# Patient Record
Sex: FEMALE | Race: OTHER | HISPANIC OR LATINO | Employment: PART TIME | URBAN - METROPOLITAN AREA
[De-identification: names, ages, dates, MRNs, and addresses within clinical notes are randomized per-mention and may not be internally consistent; named-entity substitution may affect disease eponyms.]

---

## 2017-09-20 ENCOUNTER — ALLSCRIPTS OFFICE VISIT (OUTPATIENT)
Dept: OTHER | Facility: OTHER | Age: 37
End: 2017-09-20

## 2017-11-21 ENCOUNTER — ALLSCRIPTS OFFICE VISIT (OUTPATIENT)
Dept: OTHER | Facility: OTHER | Age: 37
End: 2017-11-21

## 2017-11-22 NOTE — PROGRESS NOTES
Assessment    1  Salivary gland infection (527 2) (K11 20)    Plan  Salivary gland infection    · Amoxicillin 875 MG Oral Tablet; TAKE 1 TABLET EVERY 12 HOURS DAILY   · Ibuprofen 800 MG Oral Tablet; TAKE 1 TABLET 3 TIMES DAILY WITH FOOD ASNEEDED   · PredniSONE 10 MG Oral Tablet; TAKE 3 TABLET Daily    Discussion/Summary  Possible side effects of new medications were reviewed with the patient/guardian today  The treatment plan was reviewed with the patient/guardian  The patient/guardian understands and agrees with the treatment plan      Chief Complaint  Pt here for swelling on left side of her face after seeing the dentist yesterday      History of Present Illness  HPI: was in the dentist 's office yesterday for cleaningcheek has been swollen this am and has a lot of pain    Facial Pain (Brief): The patient is being seen for an initial evaluation of facial pain  Symptoms:  facial pain, but-- no facial weakness-- and-- no facial paresthesias  The patient is currently experiencing symptoms  Current treatment includes nonsteroidal anti-inflammatory drugs  By report there is fair symptom control  Review of Systems   Constitutional: no fever-- and-- no chills  ENT: as noted in HPI  Cardiovascular: no complaints of slow or fast heart rate, no chest pain, no palpitations, no leg claudication or lower extremity edema  Respiratory: no complaints of shortness of breath, no wheezing, no dyspnea on exertion, no orthopnea or PND  Active Problems  1  Depression (311) (F32 9)   2  Encounter for routine gynecological examination (V72 31) (Z01 419)   3  Lower back pain (724 2) (M54 5)   4  Need for prophylactic vaccination and inoculation against influenza (V04 81) (Z23)   5  Obesity (BMI 30 0-34 9) (278 00) (E66 9)   6  Rosacea (695 3) (L71 9)   7  Screening for diabetes mellitus (DM) (V77 1) (Z13 1)   8  Screening for lipoid disorders (V77 91) (Z13 220)   9   Screening for STD (sexually transmitted disease) (V74 5) (Z11 3)    Past Medical History  1  History of Cervical Cancer (V10 41)   2  History of Chlamydial Disease (078 88)   3  History of cervical cancer (V10 41) (Z85 41)   4  History of pregnancy (V13 29)   5  History of pregnancy (V13 29)   6  History of pregnancy (V13 29)   7  History of Moderate dysplasia of cervix (SALBADOR II) (622 12) (N87 1)   8  History of Normal pregnancy (V22 2) (Z34 90)   9  History of Normal pregnancy (V22 2) (Z34 90)  Active Problems And Past Medical History Reviewed: The active problems and past medical history were reviewed and updated today  Family History  Father    1  Family history of diabetes mellitus (V18 0) (Z83 3)  Maternal Grandmother    2  Family history of diabetes mellitus (V18 0) (Z83 3)   3  Family history of Ovarian cancer  Paternal Grandmother    4  Family history of diabetes mellitus (V18 0) (Z83 3)  Maternal Aunt    5  Family history of diabetes mellitus (V18 0) (Z83 3)  Paternal Aunt    10  Family history of malignant neoplasm of breast (V16 3) (Z80 3)  Family History    7  Family history of Breast Cancer (V16 3)   8  Family history of Diabetes Mellitus (V18 0)   9  Family history of Hypertension (V17 49)   10  Family history of Ovarian Cyst  Family History Reviewed: The family history was reviewed and updated today  Social History   · Denied: History of Alcohol use   · Current every day smoker (305 1) (F17 200)   · Current Smoker (305 1)   · Lack of exercise (V69 0) (Z72 3)   · Marital History - Single  The social history was reviewed and updated today  The social history was reviewed and is unchanged  Surgical History    1  History of Cervical Loop Electrosurgical Excision (LEEP)   2  History of Foot Surgery   3  History of Foot Surgery   4  History of Tonsillectomy With Adenoidectomy   5  History of Tonsillectomy With Adenoidectomy   6  History of Tubal Ligation  Surgical History Reviewed: The surgical history was reviewed and updated today  Current Meds   1  Cyclobenzaprine HCl - 10 MG Oral Tablet; TAKE 1 TABLET 3 TIMES DAILY AS NEEDED; Therapy: 29ZOS4146 to (Sherre Sacks)  Requested for: 85WWS2740; Last Rx:87Fpa0594 Ordered   2  Fluticasone Propionate 50 MCG/ACT Nasal Suspension; USE 2 SPRAYS IN EACH NOSTRIL ONCE DAILY; Therapy: 64WBN2819 to (Juan Manuel Flores)  Requested for: 37XES7136 Ordered   3  Ubolkvooh-Enfchmcu-IV 30-2-10 MG/5ML Oral Syrup; TAKE 5 ML EVERY 4 TO 6 HOURS AS NEEDED; Therapy: 80OAT4306 to (Evaluate:03Fbd9812)  Requested for: 83YRQ1982; Last Rx:51Omt5990 Ordered    The medication list was reviewed and updated today  Allergies  1  No Known Drug Allergies  2  No Known Environmental Allergies   3  No Known Food Allergies    Vitals   Recorded: 21Nov2017 02:18PM   Heart Rate 96   Respiration 16   Systolic 365   Diastolic 66   Height 5 ft 5 in   Weight 192 lb    BMI Calculated 31 95   BSA Calculated 1 94       Physical Exam   Constitutional  General appearance: No acute distress, well appearing and well nourished  Ears, Nose, Mouth, and Throat  External inspection of ears and nose: Normal    Otoscopic examination: Tympanic membranes translucent with normal light reflex  Canals patent without erythema  Oropharynx: Abnormal  -- decayed tooth left inner lower tooth  Pulmonary  Respiratory effort: No increased work of breathing or signs of respiratory distress  Auscultation of lungs: Clear to auscultation  Cardiovascular  Palpation of heart: Normal PMI, no thrills  Auscultation of heart: Normal rate and rhythm, normal S1 and S2, without murmurs  Skin  Skin and subcutaneous tissue: Abnormal  -- left salivary gland swelling          Signatures   Electronically signed by : Elodia Rodriguez MD; Nov 21 2017  2:39PM EST                       (Author)

## 2017-12-12 ENCOUNTER — GENERIC CONVERSION - ENCOUNTER (OUTPATIENT)
Dept: OTHER | Facility: OTHER | Age: 37
End: 2017-12-12

## 2017-12-12 ENCOUNTER — ALLSCRIPTS OFFICE VISIT (OUTPATIENT)
Dept: OTHER | Facility: OTHER | Age: 37
End: 2017-12-12

## 2017-12-12 DIAGNOSIS — Z13.220 ENCOUNTER FOR SCREENING FOR LIPOID DISORDERS: ICD-10-CM

## 2017-12-12 DIAGNOSIS — E66.9 OBESITY: ICD-10-CM

## 2017-12-12 DIAGNOSIS — Z13.1 ENCOUNTER FOR SCREENING FOR DIABETES MELLITUS: ICD-10-CM

## 2018-01-14 VITALS
SYSTOLIC BLOOD PRESSURE: 98 MMHG | HEART RATE: 68 BPM | BODY MASS INDEX: 31.7 KG/M2 | TEMPERATURE: 99.1 F | RESPIRATION RATE: 16 BRPM | WEIGHT: 190.5 LBS | DIASTOLIC BLOOD PRESSURE: 66 MMHG

## 2018-01-15 VITALS
HEIGHT: 65 IN | WEIGHT: 192 LBS | DIASTOLIC BLOOD PRESSURE: 66 MMHG | RESPIRATION RATE: 16 BRPM | SYSTOLIC BLOOD PRESSURE: 102 MMHG | HEART RATE: 96 BPM | BODY MASS INDEX: 31.99 KG/M2

## 2018-01-24 VITALS
RESPIRATION RATE: 16 BRPM | HEIGHT: 65 IN | BODY MASS INDEX: 31.53 KG/M2 | WEIGHT: 189.25 LBS | SYSTOLIC BLOOD PRESSURE: 102 MMHG | DIASTOLIC BLOOD PRESSURE: 60 MMHG | HEART RATE: 76 BPM

## 2018-01-30 NOTE — PROGRESS NOTES
Assessment   1  Encounter for preventive health examination (V70 0) (Z00 00)  2  Foot pain, right (729 5) (M79 671)  3  Plantar wart, right foot (078 12) (B07 0)  4  Obesity (BMI 30 0-34 9) (278 00) (E66 9)  5  Rosacea (695 3) (L71 9)  6  Screening for diabetes mellitus (DM) (V77 1) (Z13 1)  7  Screening for lipoid disorders (V77 91) (Z13 220)    Plan  Foot pain, right    · 1 - Syed Stephen, [facility]  *  Status: Hold For - Scheduling   Requested for: 13ZRP2145  () Care Summary provided  : Yes  Health Maintenance    · Follow-up visit in 1 year Evaluation and Treatment  Follow-up  Status: Hold For -  Scheduling  Requested for: 70Ggv0015   · Always use a seat belt and shoulder strap when riding or driving a motor vehicle ;  Status:Complete;   Done: 56UXF6481   · Begin a limited exercise program ; Status:Complete;   Done: 80AKP6664   · Begin or continue regular aerobic exercise  Gradually work up to at least 3 sessions of 30  minutes of exercise a week ; Status:Complete;   Done: 71ZAA6105   · Diets that are low in carbohydrates and high in protein are very popular for weight loss ;  Status:Complete;   Done: 97RBR0599   · Eat a low fat and low cholesterol diet ; Status:Complete;   Done: 71HQK7042   · Use a sun block product with an SPF of 15 or more ; Status:Complete;   Done:  51DUR1792   · We recommend that you bring your body mass index down to 26 ; Status:Complete;    Done: 65MQE5095   · We recommend you modify your diet to achieve and maintain a healthy weight  Being  underweight may increase your risk of developing health problems from vitamin and  mineral deficiencies  We recommend a balanced diet rich in fruits and vegetables  You  may also consider increasing your calorie intake by eating more frequently or adding  nuts, avocados, and low-fat cheese or milk to your meals    Please let us know  if you would like to learn more about your nutrition and calorie needs, and additional  options to help you achieve your weight goals ; Status:Complete;   Done: 53MJJ3804  Obesity (BMI 30 0-34 9), Screening for diabetes mellitus (DM), Screening for lipoid  disorders    · (1) COMPREHENSIVE METABOLIC PANEL; Status:Active; Requested for:59Btd5577;    · (1) LIPID PANEL, FASTING; Status:Active; Requested for:91Hey9918;    · (1) TSH WITH FT4 REFLEX; Status:Active; Requested for:91Iyo4832;   Plantar wart, right foot    · Start: Salicylic Acid Wart Remover 27 5 % External Liquid; apply once a day for 4-6  weeks  Rosacea    · Start: MetroNIDAZOLE 0 75 % External Gel; APPLY  AND RUB  IN A THIN FILM TO  AFFECTED AREAS TWICE DAILY  (AM AND PM)    Discussion/Summary  health maintenance visit Currently, she eats an adequate diet  the risks and benefits of cervical cancer screening were discussed cervical cancer screening is needed every three years Breast cancer screening: the risks and benefits of breast cancer screening were discussed and breast cancer screening is not indicated  Colorectal cancer screening: the risks and benefits of colorectal cancer screening were discussed and colorectal cancer screening is not indicated  Chief Complaint  PT is being seen today for a  visit  History of Present Illness  HM, Adult Female: The patient is being seen for a health maintenance evaluation  The last health maintenance visit was 1 year(s) ago  General Health: The patient's health since the last visit is described as good  She has regular dental visits  She denies vision problems  Immunizations status: not up to date  Lifestyle:  She consumes a diverse and healthy diet  She does not have any weight concerns  She does not exercise regularly  She does not use tobacco  She denies alcohol use  She denies drug use  Reproductive health: Micheal Rviero She reports normal menses  She uses no contraception  She is sexually active  Pregnancy history: G1  31 P1  31   Screening: cancer screening reviewed and updated     metabolic screening reviewed and updated  risk screening reviewed and updated  HPI: continued to have right foot pain   seen by 3 different Podiatrist   went through PT which didn't help           1 Amended By: Ximena Osorio; Dec 12 2017 10:42 AM EST    Review of Systems    Constitutional: No fever, no chills, feels well, no tiredness, no recent weight gain or weight loss  Eyes: No complaints of eye pain, no red eyes, no eyesight problems, no discharge, no dry eyes, no itching of eyes  ENT: no complaints of earache, no loss of hearing, no nose bleeds, no nasal discharge, no sore throat, no hoarseness  Cardiovascular: No complaints of slow heart rate, no fast heart rate, no chest pain, no palpitations, no leg claudication, no lower extremity edema  Respiratory: No complaints of shortness of breath, no wheezing, no cough, no SOB on exertion, no orthopnea, no PND  Gastrointestinal: No complaints of abdominal pain, no constipation, no nausea or vomiting, no diarrhea, no bloody stools  Genitourinary: No complaints of dysuria, no incontinence, no pelvic pain, no dysmenorrhea, no vaginal discharge or bleeding  Musculoskeletal: arthralgias, but no myalgias  Integumentary: No complaints of skin rash or lesions, no itching, no skin wounds, no breast pain or lump  Neurological: No complaints of headache, no confusion, no convulsions, no numbness, no dizziness or fainting, no tingling, no limb weakness, no difficulty walking  Psychiatric: Not suicidal, no sleep disturbance, no anxiety or depression, no change in personality, no emotional problems  Endocrine: No complaints of proptosis, no hot flashes, no muscle weakness, no deepening of the voice, no feelings of weakness  Hematologic/Lymphatic: No complaints of swollen glands, no swollen glands in the neck, does not bleed easily, does not bruise easily  Active Problems   1  Encounter for routine gynecological examination (V72 31) (Z01 419)  2   Lower back pain (724 2) (M54 5)  3  Need for prophylactic vaccination and inoculation against influenza (V04 81) (Z23)  4  Obesity (BMI 30 0-34 9) (278 00) (E66 9)  5  Rosacea (695 3) (L71 9)  6  Screening for diabetes mellitus (DM) (V77 1) (Z13 1)  7  Screening for lipoid disorders (V77 91) (Z13 220)  8  Screening for STD (sexually transmitted disease) (V74 5) (Z11 3)    Past Medical History    · History of Cervical Cancer (V10 41)   · History of Chlamydial Disease (078 88)   · History of cervical cancer (V10 41) (Z85 41)   · History of pregnancy (V13 29)   · History of pregnancy (V13 29)   · History of pregnancy (V13 29)   · History of Moderate dysplasia of cervix (SALBADOR II) (622 12) (N87 1)   · History of Normal pregnancy (V22 2) (Z34 90)   · History of Normal pregnancy (V22 2) (Z34 90)    Surgical History    · History of Cervical Loop Electrosurgical Excision (LEEP)   · History of Foot Surgery   · History of Foot Surgery   · History of Tonsillectomy With Adenoidectomy   · History of Tonsillectomy With Adenoidectomy   · History of Tubal Ligation    Family History  Father    · Family history of diabetes mellitus (V18 0) (Z83 3)  Maternal Grandmother    · Family history of diabetes mellitus (V18 0) (Z83 3)   · Family history of Ovarian cancer  Paternal Grandmother    · Family history of diabetes mellitus (V18 0) (Z83 3)  Maternal Aunt    · Family history of diabetes mellitus (V18 0) (Z83 3)  Paternal Aunt    · Family history of malignant neoplasm of breast (V16 3) (Z80 3)  Family History    · Family history of Breast Cancer (V16 3)   · Family history of Diabetes Mellitus (V18 0)   · Family history of Hypertension (V17 49)   · Family history of Ovarian Cyst    Social History    · Denied: History of Alcohol use   · Current every day smoker (305 1) (F17 200)   · Current Smoker (305 1)   · Lack of exercise (V69 0) (Z72 3)   · Marital History - Single    Current Meds  1   Cyclobenzaprine HCl - 10 MG Oral Tablet; TAKE 1 TABLET 3 TIMES DAILY AS NEEDED; Therapy: 53LBM1712 to (Luz Maria Velasco)  Requested for: 44WGS7751; Last   Rx:09Mby3188 Ordered  2  Fluticasone Propionate 50 MCG/ACT Nasal Suspension; USE 2 SPRAYS IN EACH   NOSTRIL ONCE DAILY; Therapy: 96AUA8494 to (Last Rx:46Jol5340)  Requested for: 10XZC2091 Ordered    Allergies   1  No Known Drug Allergies   2  No Known Environmental Allergies  3  No Known Food Allergies    Vitals   Recorded: 97Tzr5358 10:16AM   Heart Rate 76   Respiration 16   Systolic 678   Diastolic 60   Height 5 ft 4 96 in   Weight 189 lb 4 oz   BMI Calculated 31 53   BSA Calculated 1 93     Physical Exam    Constitutional   General appearance: No acute distress, well appearing and well nourished  Head and Face   Head and face: Normal     Palpation of the face and sinuses: No sinus tenderness  Eyes   Conjunctiva and lids: No swelling, erythema or discharge  Pupils and irises: Equal, round, reactive to light  Ophthalmoscopic examination: Normal fundi and optic discs  Ears, Nose, Mouth, and Throat   External inspection of ears and nose: Normal     Otoscopic examination: Tympanic membranes translucent with normal light reflex  Canals patent without erythema  Hearing: Normal     Nasal mucosa, septum, and turbinates: Normal without edema or erythema  Lips, teeth, and gums: Normal, good dentition  Oropharynx: Normal with no erythema, edema, exudate or lesions  Neck   Neck: Supple, symmetric, trachea midline, no masses  Thyroid: Normal, no thyromegaly  Pulmonary   Respiratory effort: No increased work of breathing or signs of respiratory distress  Percussion of chest: Normal     Auscultation of lungs: Clear to auscultation  Cardiovascular   Palpation of heart: Normal PMI, no thrills  Auscultation of heart: Normal rate and rhythm, normal S1 and S2, no murmurs      Examination of extremities for edema and/or varicosities: Normal     Chest   Chest: Normal     Abdomen   Abdomen: Non-tender, no masses  Liver and spleen: No hepatomegaly or splenomegaly  Lymphatic   Palpation of lymph nodes in neck: No lymphadenopathy  Palpation of lymph nodes in axillae: No lymphadenopathy  Palpation of lymph nodes in groin: No lymphadenopathy  Musculoskeletal   Gait and station: Normal     Digits and nails: Normal without clubbing or cyanosis  Joints, bones, and muscles: Abnormal   plantar wart right foot  Skin   Skin and subcutaneous tissue: Abnormal   rosacea bilateral cheek  Neurologic   Cranial nerves: Cranial nerves II-XII intact  Cortical function: Normal mental status  Reflexes: 2+ and symmetric  Sensation: No sensory loss  Coordination: Normal finger to nose and heel to shin  Psychiatric   Judgment and insight: Normal     Orientation to person, place, and time: Normal     Recent and remote memory: Intact      Mood and affect: Normal        Signatures   Electronically signed by : Terra Cm MD; Dec 12 2017 10:42AM EST                       (Author)

## 2018-10-08 ENCOUNTER — OFFICE VISIT (OUTPATIENT)
Dept: OBGYN CLINIC | Facility: HOSPITAL | Age: 38
End: 2018-10-08
Payer: COMMERCIAL

## 2018-10-08 VITALS
WEIGHT: 176 LBS | HEART RATE: 91 BPM | DIASTOLIC BLOOD PRESSURE: 71 MMHG | SYSTOLIC BLOOD PRESSURE: 101 MMHG | BODY MASS INDEX: 29.32 KG/M2 | HEIGHT: 65 IN

## 2018-10-08 DIAGNOSIS — Z11.3 SCREEN FOR SEXUALLY TRANSMITTED DISEASES: ICD-10-CM

## 2018-10-08 DIAGNOSIS — Z72.51 HIGH RISK SEXUAL BEHAVIOR, UNSPECIFIED TYPE: Primary | ICD-10-CM

## 2018-10-08 DIAGNOSIS — N93.0 PCB (POST COITAL BLEEDING): ICD-10-CM

## 2018-10-08 PROBLEM — N93.9 ABNORMAL UTERINE AND VAGINAL BLEEDING, UNSPECIFIED: Status: ACTIVE | Noted: 2018-10-08

## 2018-10-08 PROCEDURE — 87591 N.GONORRHOEAE DNA AMP PROB: CPT | Performed by: OBSTETRICS & GYNECOLOGY

## 2018-10-08 PROCEDURE — 99213 OFFICE O/P EST LOW 20 MIN: CPT | Performed by: OBSTETRICS & GYNECOLOGY

## 2018-10-08 PROCEDURE — 3725F SCREEN DEPRESSION PERFORMED: CPT | Performed by: OBSTETRICS & GYNECOLOGY

## 2018-10-08 PROCEDURE — 87491 CHLMYD TRACH DNA AMP PROBE: CPT | Performed by: OBSTETRICS & GYNECOLOGY

## 2018-10-08 NOTE — PATIENT INSTRUCTIONS
How to Stop Smoking   AMBULATORY CARE:   You will improve your health and the health of others around you  if you stop smoking  Your risk for heart and lung disease, cancer, stroke, heart attack, and vision problems will also decrease  You can benefit from quitting no matter how long you have smoked  Prepare to stop smoking:  Nicotine is a highly addictive drug found in cigarettes  Withdrawal symptoms can happen when you stop smoking and make it hard to quit  These include anxiety, depression, irritability, trouble sleeping, and increased appetite  You increase your chances of success if you prepare to quit  · Set a quit date  Amisha Barnes a date that is within the next 2 weeks  Do not pick a day that you think may be stressful or busy  Write down the day or [place] it on your calender  · Tell friends and family that you plan to quit  Explain that you may have withdrawal symptoms when you try to quit  Ask them to support you  They may be able to encourage you and help reduce your stress to make it easier for you to quit  · Make a list of your reasons for quitting  Put the list somewhere you will see it every day, such as your refrigerator  You can look at the list when you have a craving  · Remove all tobacco and nicotine products from your home, car, and workplace  Also, remove anything else that will tempt you to smoke, such as lighters, matches, or ashtrays  Clean your car, home, and places at work that smell like smoke  The smell of smoke can trigger a craving  · Identify triggers that make you want to smoke  This may include activities, feelings, or people  Also write down 1 way you can deal with each of your triggers  For example, if you want to smoke as soon as you wake up, plan another activity during this time, such as exercise  · Make a plan for how you will quit  Learn about the tools that can help you quit, such as medicine, counseling, or nicotine replacement therapy   Choose at least 2 options to help you quit  Tools to help you stop smoking:   · Counseling  from a trained healthcare provider can provide you with support and skills to quit smoking  The provider will also teach you to manage your withdrawal symptoms and cravings  You may receive counseling from one counselor, in group therapy, or through phone therapy called a quit line  · Nicotine replacement therapy (NRT)  such as nicotine patches, gum, or lozenges may help reduce your nicotine cravings  You may get these without a doctor's order  Do not use e-cigarettes or smokeless tobacco in place of cigarettes or to help you quit  They still contain nicotine  · Prescription medicines  such as nasal sprays or nicotine inhalers may help reduce your withdrawal symptoms  Other medicines may also be used to reduce your urge to smoke  Ask your healthcare provider about these medicines  You may need to start certain medicines 2 weeks before your quit date for them to work well  · Hypnosis  is a practice that helps guide you through thoughts and feelings  Hypnosis may help decrease your cravings and make you more willing to quit  · Acupuncture therapy  uses very thin needles to balance energy channels in the body  This is thought to help decrease cravings and symptoms of nicotine withdrawal      · Support groups  let you talk to others who are trying to quit or have already quit  It may be helpful to speak with others about how they quit  Manage your cravings:   · Avoid situations, people, and places that tempt you to smoke  Go to nonsmoking places, such as libraries or restaurants  Understand what tempts you and try to avoid these things  · Keep your hands busy  Hold things such as a stress ball or pen  · Put candy or toothpicks in your mouth  Keep lollipops, sugarless gum, or toothpicks with you at all times  · Do not have alcohol or caffeine  These drinks may tempt you to smoke   Drink healthy liquids such as water or juice instead  · Reward yourself when you resist your cravings  Rewards will motivate you and help you stay positive  · Do an activity that distracts you from your craving  Examples include going for a walk, exercising, or cleaning  Prevent weight gain after you quit:  You may gain a few pounds after you quit smoking  It is healthier for you to gain a few pounds than to continue to smoke  The following can help you prevent weight gain:  · Eat healthy foods  These include fruits, vegetables, whole-grain breads, low-fat dairy products, beans, lean meats, and fish  Eat healthy snacks, such as low-fat yogurt, if you get hungry between meals  · Drink water before, during, and between meals  This will make your stomach feel full and help prevent you from overeating  Ask your healthcare provider how much liquid to drink each day and which liquids are best for you  · Exercise  Take a walk or do some kind of exercise every day  Ask your healthcare provider what exercise is right for you  This may help reduce your cravings and reduce stress  For more support and information:   · [employer]  [employer]  Phone: 4- 255 - 927-8433  Web Address: www [employer]  gov  © 2017 [address] [employer] Information is for End User's use only and may not be sold, redistributed or otherwise used for commercial purposes  All illustrations and images included in CareNotes® are the copyrighted property of A D A M , Inc  or [employer]org[employer]  The above information is an  only  It is not intended as medical advice for individual conditions or treatments  Talk to your doctor, nurse or pharmacist before following any medical regimen to see if it is safe and effective for you

## 2018-10-08 NOTE — PROGRESS NOTES
Assessment/Plan:    Problem List Items Addressed This Visit        Other    PCB (post coital bleeding)     Reports post-coital bleeding 3 instances this past week for first time  No gross abnormalities noted on exam  Will r/o STD w/ GC/CT  Patient to return in 2 weeks for annual gyn exam and to follow up status of post coital bleeding         Relevant Orders    Chlamydia/GC amplified DNA by PCR      Other Visit Diagnoses     High risk sexual behavior, unspecified type    -  Primary    Relevant Orders    Chlamydia/GC amplified DNA by PCR    Screen for sexually transmitted diseases        Relevant Orders    Chlamydia/GC amplified DNA by PCR          Subjective:      Patient ID: Olman Verma is a 41 y o  female  HPI  Margaret Jaeger is a 42 yo  with BTL for contraception presenting with complaints of coital/ post-coital bleeding  LMP was 2018  She states this past week she has had 3 instances where she has noted bright red blood during intercourse  She reports the bleeding stops after intercourse and that this has never happened in the past  Patient has regular menses and denies any intermenstrual bleeding  She denies any dyspareunia, vaginal discharge, odor, or itching  She denies any dysuria  She reports a past history of chlamydia when she was 19 yo and denies any other history of STDs  She reports the same partner for the last 12-13 years, however, reports infidelity last year on his part  GC/CT in 2016 negative  She has a history of CIN2 in 2005 and is s/p LEEP  Pap in 2016 NILM and HPV neg  Review of Systems   Constitutional: Negative for chills and fever  Respiratory: Negative for shortness of breath  Cardiovascular: Negative for chest pain and palpitations  Gastrointestinal: Negative for abdominal pain, nausea and vomiting  Genitourinary: Positive for vaginal bleeding   Negative for difficulty urinating, dyspareunia, dysuria, genital sores, hematuria, pelvic pain, urgency, vaginal discharge and vaginal pain  Objective:    /71   Pulse 91   Ht 5' 5" (1 651 m)   Wt 79 8 kg (176 lb)   LMP 09/20/2018 (Approximate)   BMI 29 29 kg/m²      Physical Exam   Abdominal: Soft  She exhibits no distension and no mass  There is no tenderness  There is no rebound and no guarding  Genitourinary: Vagina normal and uterus normal  Uterus is not fixed and not tender  Cervix exhibits friability  Cervix exhibits no motion tenderness and no discharge  Right adnexum displays no mass, no tenderness and no fullness  Left adnexum displays no mass, no tenderness and no fullness  No tenderness or bleeding in the vagina  No signs of injury around the vagina  No vaginal discharge found  Vitals reviewed

## 2018-10-08 NOTE — ASSESSMENT & PLAN NOTE
Reports post-coital bleeding 3 instances this past week for first time  No gross abnormalities noted on exam  Will r/o STD w/ GC/CT  Patient to return in 2 weeks for annual gyn exam and to follow up status of post coital bleeding

## 2018-10-09 LAB
CHLAMYDIA DNA CVX QL NAA+PROBE: NORMAL
N GONORRHOEA DNA GENITAL QL NAA+PROBE: NORMAL

## 2019-06-05 ENCOUNTER — TELEPHONE (OUTPATIENT)
Dept: FAMILY MEDICINE CLINIC | Facility: CLINIC | Age: 39
End: 2019-06-05

## 2019-06-05 ENCOUNTER — OFFICE VISIT (OUTPATIENT)
Dept: FAMILY MEDICINE CLINIC | Facility: CLINIC | Age: 39
End: 2019-06-05
Payer: COMMERCIAL

## 2019-06-05 VITALS
BODY MASS INDEX: 29.76 KG/M2 | HEIGHT: 65 IN | WEIGHT: 178.6 LBS | OXYGEN SATURATION: 97 % | DIASTOLIC BLOOD PRESSURE: 70 MMHG | SYSTOLIC BLOOD PRESSURE: 114 MMHG | HEART RATE: 81 BPM | RESPIRATION RATE: 12 BRPM

## 2019-06-05 DIAGNOSIS — L71.9 ROSACEA: ICD-10-CM

## 2019-06-05 DIAGNOSIS — M79.672 FOOT PAIN, BILATERAL: ICD-10-CM

## 2019-06-05 DIAGNOSIS — Z00.00 ANNUAL PHYSICAL EXAM: Primary | ICD-10-CM

## 2019-06-05 DIAGNOSIS — M79.671 FOOT PAIN, BILATERAL: ICD-10-CM

## 2019-06-05 PROCEDURE — 99395 PREV VISIT EST AGE 18-39: CPT | Performed by: FAMILY MEDICINE

## 2019-06-05 RX ORDER — CEPHALEXIN 500 MG/1
500 CAPSULE ORAL EVERY 8 HOURS SCHEDULED
Qty: 21 CAPSULE | Refills: 0 | Status: SHIPPED | OUTPATIENT
Start: 2019-06-05 | End: 2019-06-12

## 2019-08-01 ENCOUNTER — APPOINTMENT (OUTPATIENT)
Dept: LAB | Facility: CLINIC | Age: 39
End: 2019-08-01
Payer: COMMERCIAL

## 2019-08-01 DIAGNOSIS — Z00.00 ANNUAL PHYSICAL EXAM: ICD-10-CM

## 2019-08-01 DIAGNOSIS — M79.671 FOOT PAIN, BILATERAL: ICD-10-CM

## 2019-08-01 DIAGNOSIS — M79.672 FOOT PAIN, BILATERAL: ICD-10-CM

## 2019-08-01 LAB
ALBUMIN SERPL BCP-MCNC: 4 G/DL (ref 3.5–5)
ALP SERPL-CCNC: 68 U/L (ref 46–116)
ALT SERPL W P-5'-P-CCNC: 20 U/L (ref 12–78)
ANION GAP SERPL CALCULATED.3IONS-SCNC: 5 MMOL/L (ref 4–13)
AST SERPL W P-5'-P-CCNC: 16 U/L (ref 5–45)
BASOPHILS # BLD AUTO: 0.05 THOUSANDS/ΜL (ref 0–0.1)
BASOPHILS NFR BLD AUTO: 1 % (ref 0–1)
BILIRUB SERPL-MCNC: 0.96 MG/DL (ref 0.2–1)
BUN SERPL-MCNC: 10 MG/DL (ref 5–25)
CALCIUM SERPL-MCNC: 9.2 MG/DL (ref 8.3–10.1)
CHLORIDE SERPL-SCNC: 106 MMOL/L (ref 100–108)
CHOLEST SERPL-MCNC: 214 MG/DL (ref 50–200)
CO2 SERPL-SCNC: 26 MMOL/L (ref 21–32)
CREAT SERPL-MCNC: 0.77 MG/DL (ref 0.6–1.3)
CRP SERPL QL: <3 MG/L
EOSINOPHIL # BLD AUTO: 0.15 THOUSAND/ΜL (ref 0–0.61)
EOSINOPHIL NFR BLD AUTO: 3 % (ref 0–6)
ERYTHROCYTE [DISTWIDTH] IN BLOOD BY AUTOMATED COUNT: 12.7 % (ref 11.6–15.1)
ERYTHROCYTE [SEDIMENTATION RATE] IN BLOOD: 10 MM/HOUR (ref 0–20)
GFR SERPL CREATININE-BSD FRML MDRD: 100 ML/MIN/1.73SQ M
GLUCOSE P FAST SERPL-MCNC: 86 MG/DL (ref 65–99)
HCT VFR BLD AUTO: 40.5 % (ref 34.8–46.1)
HDLC SERPL-MCNC: 51 MG/DL (ref 40–60)
HGB BLD-MCNC: 13.1 G/DL (ref 11.5–15.4)
IMM GRANULOCYTES # BLD AUTO: 0.02 THOUSAND/UL (ref 0–0.2)
IMM GRANULOCYTES NFR BLD AUTO: 0 % (ref 0–2)
LDLC SERPL CALC-MCNC: 147 MG/DL (ref 0–100)
LYMPHOCYTES # BLD AUTO: 1.89 THOUSANDS/ΜL (ref 0.6–4.47)
LYMPHOCYTES NFR BLD AUTO: 32 % (ref 14–44)
MCH RBC QN AUTO: 30.5 PG (ref 26.8–34.3)
MCHC RBC AUTO-ENTMCNC: 32.3 G/DL (ref 31.4–37.4)
MCV RBC AUTO: 94 FL (ref 82–98)
MONOCYTES # BLD AUTO: 0.45 THOUSAND/ΜL (ref 0.17–1.22)
MONOCYTES NFR BLD AUTO: 8 % (ref 4–12)
NEUTROPHILS # BLD AUTO: 3.44 THOUSANDS/ΜL (ref 1.85–7.62)
NEUTS SEG NFR BLD AUTO: 56 % (ref 43–75)
NRBC BLD AUTO-RTO: 0 /100 WBCS
PLATELET # BLD AUTO: 231 THOUSANDS/UL (ref 149–390)
PMV BLD AUTO: 12.7 FL (ref 8.9–12.7)
POTASSIUM SERPL-SCNC: 3.7 MMOL/L (ref 3.5–5.3)
PROT SERPL-MCNC: 7.5 G/DL (ref 6.4–8.2)
RBC # BLD AUTO: 4.29 MILLION/UL (ref 3.81–5.12)
SODIUM SERPL-SCNC: 137 MMOL/L (ref 136–145)
TRIGL SERPL-MCNC: 80 MG/DL
TSH SERPL DL<=0.05 MIU/L-ACNC: 2.29 UIU/ML (ref 0.36–3.74)
WBC # BLD AUTO: 6 THOUSAND/UL (ref 4.31–10.16)

## 2019-08-01 PROCEDURE — 80053 COMPREHEN METABOLIC PANEL: CPT | Performed by: FAMILY MEDICINE

## 2019-08-01 PROCEDURE — 86038 ANTINUCLEAR ANTIBODIES: CPT | Performed by: FAMILY MEDICINE

## 2019-08-01 PROCEDURE — 84443 ASSAY THYROID STIM HORMONE: CPT

## 2019-08-01 PROCEDURE — 86140 C-REACTIVE PROTEIN: CPT | Performed by: FAMILY MEDICINE

## 2019-08-01 PROCEDURE — 85025 COMPLETE CBC W/AUTO DIFF WBC: CPT

## 2019-08-01 PROCEDURE — 85652 RBC SED RATE AUTOMATED: CPT

## 2019-08-01 PROCEDURE — 80061 LIPID PANEL: CPT

## 2019-08-01 PROCEDURE — 36415 COLL VENOUS BLD VENIPUNCTURE: CPT | Performed by: FAMILY MEDICINE

## 2019-08-01 PROCEDURE — 86200 CCP ANTIBODY: CPT

## 2019-08-02 LAB — RYE IGE QN: NEGATIVE

## 2019-08-03 LAB — CCP IGA+IGG SERPL IA-ACNC: 5 UNITS (ref 0–19)

## 2020-01-14 ENCOUNTER — OFFICE VISIT (OUTPATIENT)
Dept: FAMILY MEDICINE CLINIC | Facility: CLINIC | Age: 40
End: 2020-01-14
Payer: COMMERCIAL

## 2020-01-14 VITALS
OXYGEN SATURATION: 97 % | HEIGHT: 65 IN | RESPIRATION RATE: 18 BRPM | HEART RATE: 74 BPM | DIASTOLIC BLOOD PRESSURE: 80 MMHG | BODY MASS INDEX: 32.26 KG/M2 | TEMPERATURE: 97.8 F | SYSTOLIC BLOOD PRESSURE: 102 MMHG | WEIGHT: 193.6 LBS

## 2020-01-14 DIAGNOSIS — F33.42 RECURRENT MAJOR DEPRESSIVE DISORDER, IN FULL REMISSION (HCC): Primary | ICD-10-CM

## 2020-01-14 DIAGNOSIS — L71.9 ROSACEA: ICD-10-CM

## 2020-01-14 DIAGNOSIS — G89.29 CHRONIC BILATERAL LOW BACK PAIN WITHOUT SCIATICA: ICD-10-CM

## 2020-01-14 DIAGNOSIS — M54.50 CHRONIC BILATERAL LOW BACK PAIN WITHOUT SCIATICA: ICD-10-CM

## 2020-01-14 PROCEDURE — 99214 OFFICE O/P EST MOD 30 MIN: CPT | Performed by: FAMILY MEDICINE

## 2020-01-14 PROCEDURE — 3008F BODY MASS INDEX DOCD: CPT | Performed by: FAMILY MEDICINE

## 2020-01-14 RX ORDER — IBUPROFEN 600 MG/1
600 TABLET ORAL EVERY 6 HOURS PRN
Qty: 90 TABLET | Refills: 0 | Status: SHIPPED | OUTPATIENT
Start: 2020-01-14 | End: 2020-08-04

## 2020-01-14 RX ORDER — BUPROPION HYDROCHLORIDE 150 MG/1
150 TABLET ORAL EVERY MORNING
Qty: 30 TABLET | Refills: 5 | Status: SHIPPED | OUTPATIENT
Start: 2020-01-14 | End: 2020-11-11 | Stop reason: ALTCHOICE

## 2020-01-14 RX ORDER — METRONIDAZOLE 7.5 MG/G
GEL TOPICAL 2 TIMES DAILY
Qty: 45 G | Refills: 0 | Status: SHIPPED | OUTPATIENT
Start: 2020-01-14 | End: 2020-08-04 | Stop reason: SDUPTHER

## 2020-01-14 NOTE — PROGRESS NOTES
Assessment/Plan:    No problem-specific Assessment & Plan notes found for this encounter  Diagnoses and all orders for this visit:    Recurrent major depressive disorder, in full remission ([facility] [address] )  -     buPROPion (WELLBUTRIN XL) 150 mg 24 hr tablet; Take 1 tablet (150 mg total) by mouth every morning    Rosacea  -     metroNIDAZOLE (METROGEL) 0 75 % gel; Apply topically 2 (two) times a day    Chronic bilateral low back pain without sciatica  Comments:  tylenol PRN   Orders:  -     ibuprofen (MOTRIN) 600 mg tablet; Take 1 tablet (600 mg total) by mouth every 6 (six) hours as needed for mild pain          Subjective:      Patient ID: Francoise Servin is a 42 y o  female  Here to follow up  Feeling tired all the time and overwhelmed   Feet and back pain on and off for years       Anxiety   Presents for follow-up visit  Symptoms include excessive worry, irritability, nervous/anxious behavior and restlessness  Patient reports no chest pain, compulsions, confusion, decreased concentration, depressed mood, dizziness, dry mouth, feeling of choking, hyperventilation, impotence, insomnia, malaise, muscle tension, nausea, obsessions, palpitations, panic, shortness of breath or suicidal ideas  Symptoms occur occasionally  The quality of sleep is good  Nighttime awakenings: none  Compliance with medications is %  The following portions of the patient's history were reviewed and updated as appropriate: allergies, current medications, past family history, past medical history, past social history, past surgical history and problem list     Review of Systems   Constitutional: Positive for irritability  Respiratory: Negative for shortness of breath  Cardiovascular: Negative for chest pain and palpitations  Gastrointestinal: Negative for nausea  Genitourinary: Negative for impotence  Neurological: Negative for dizziness     Psychiatric/Behavioral: Negative for confusion, decreased concentration and suicidal ideas  The patient is nervous/anxious  The patient does not have insomnia  Objective:      /80 (BP Location: Left arm, Patient Position: Sitting, Cuff Size: Standard)   Pulse 74   Temp 97 8 °F (36 6 °C) (Tympanic)   Resp 18   Ht 5' 5" (1 651 m)   Wt 87 8 kg (193 lb 9 6 oz)   SpO2 97%   BMI 32 22 kg/m²          Physical Exam   Constitutional: She appears well-developed and well-nourished  HENT:   Mouth/Throat: No oropharyngeal exudate  Cardiovascular: Normal rate and regular rhythm  Exam reveals no friction rub  No murmur heard  Pulmonary/Chest: Effort normal and breath sounds normal  No stridor  No respiratory distress  Abdominal: She exhibits no distension  There is no tenderness  Skin: Rash noted  There is erythema     face

## 2020-08-04 ENCOUNTER — OFFICE VISIT (OUTPATIENT)
Dept: FAMILY MEDICINE CLINIC | Facility: CLINIC | Age: 40
End: 2020-08-04
Payer: COMMERCIAL

## 2020-08-04 VITALS
WEIGHT: 193.6 LBS | RESPIRATION RATE: 18 BRPM | BODY MASS INDEX: 32.26 KG/M2 | SYSTOLIC BLOOD PRESSURE: 90 MMHG | DIASTOLIC BLOOD PRESSURE: 74 MMHG | HEIGHT: 65 IN | TEMPERATURE: 96.6 F | OXYGEN SATURATION: 97 % | HEART RATE: 79 BPM

## 2020-08-04 DIAGNOSIS — L71.9 ROSACEA: ICD-10-CM

## 2020-08-04 DIAGNOSIS — F33.42 RECURRENT MAJOR DEPRESSIVE DISORDER, IN FULL REMISSION (HCC): Primary | ICD-10-CM

## 2020-08-04 DIAGNOSIS — G43.009 MIGRAINE WITHOUT AURA AND WITHOUT STATUS MIGRAINOSUS, NOT INTRACTABLE: ICD-10-CM

## 2020-08-04 DIAGNOSIS — Z23 NEED FOR DIPHTHERIA-TETANUS-PERTUSSIS (TDAP) VACCINE: ICD-10-CM

## 2020-08-04 PROCEDURE — 1036F TOBACCO NON-USER: CPT | Performed by: FAMILY MEDICINE

## 2020-08-04 PROCEDURE — 90715 TDAP VACCINE 7 YRS/> IM: CPT

## 2020-08-04 PROCEDURE — 99214 OFFICE O/P EST MOD 30 MIN: CPT | Performed by: FAMILY MEDICINE

## 2020-08-04 PROCEDURE — 90471 IMMUNIZATION ADMIN: CPT

## 2020-08-04 PROCEDURE — 3008F BODY MASS INDEX DOCD: CPT | Performed by: FAMILY MEDICINE

## 2020-08-04 RX ORDER — METRONIDAZOLE 7.5 MG/G
GEL TOPICAL 2 TIMES DAILY
Qty: 45 G | Refills: 0 | Status: SHIPPED | OUTPATIENT
Start: 2020-08-04 | End: 2020-08-04

## 2020-08-04 RX ORDER — METRONIDAZOLE 7.5 MG/G
GEL TOPICAL
Qty: 45 G | Refills: 0 | Status: SHIPPED | OUTPATIENT
Start: 2020-08-04 | End: 2022-06-08

## 2020-08-04 RX ORDER — CEPHALEXIN 500 MG/1
500 CAPSULE ORAL EVERY 8 HOURS SCHEDULED
Qty: 21 CAPSULE | Refills: 0 | Status: SHIPPED | OUTPATIENT
Start: 2020-08-04 | End: 2020-08-11

## 2020-08-04 NOTE — PROGRESS NOTES
Assessment/Plan:    No problem-specific Assessment & Plan notes found for this encounter  Diagnoses and all orders for this visit:    Recurrent major depressive disorder, in full remission ([facility] [address] )  Comments:  not on any meds  continue to monitor    Rosacea  -     cephalexin (KEFLEX) 500 mg capsule; Take 1 capsule (500 mg total) by mouth every 8 (eight) hours for 7 days  -     metroNIDAZOLE (METROGEL) 0 75 % gel; Apply topically 2 (two) times a day    Migraine without aura and without status migrainosus, not intractable  Comments:  Magnesium 400 mg daily and B2 400 mg daily     Need for diphtheria-tetanus-pertussis (Tdap) vaccine  -     TDAP VACCINE GREATER THAN OR EQUAL TO 6YO IM      BMI Counseling: Body mass index is 32 22 kg/m²  The BMI is above normal  Nutrition recommendations include decreasing portion sizes and encouraging healthy choices of fruits and vegetables  Exercise recommendations include moderate physical activity 150 minutes/week  No pharmacotherapy was ordered  Subjective:      Patient ID: Michael Addison is a 42 y o  female  42 yr old female c/o Wearing mask is making her rosacea worse   Didn't get to try metro gel since it was expensive   Stopped smoking 1 month ago and migraines are more frequent since then   Tried Motrin which didn't help   Not taking her Wellbutrin currently     Anxiety   Presents for follow-up visit  Patient reports no chest pain, compulsions, confusion, excessive worry, feeling of choking, hyperventilation, impotence, insomnia, irritability, malaise, muscle tension, nausea, nervous/anxious behavior, obsessions, palpitations or panic  The following portions of the patient's history were reviewed and updated as appropriate: allergies, current medications, past family history, past medical history, past social history, past surgical history and problem list     Review of Systems   Constitutional: Negative for irritability     Cardiovascular: Negative for chest pain and palpitations  Gastrointestinal: Negative for nausea  Genitourinary: Negative for impotence  Psychiatric/Behavioral: Negative for confusion  The patient is not nervous/anxious and does not have insomnia  Objective:      BP 90/74 (BP Location: Left arm, Patient Position: Sitting, Cuff Size: Large)   Pulse 79   Temp (!) 96 6 °F (35 9 °C) (Temporal)   Resp 18   Ht 5' 5"   Wt 87 8 kg (193 lb 9 6 oz)   SpO2 97%   BMI 32 22 kg/m²          Physical Exam   HENT:   Head: Normocephalic and atraumatic  Right Ear: Tympanic membrane normal    Left Ear: Tympanic membrane normal    Nose: Nose normal  No rhinorrhea or congestion  Neck: Normal range of motion  Neck supple  Cardiovascular: Normal rate and regular rhythm  Pulmonary/Chest: Effort normal and breath sounds normal    Abdominal: Normal appearance  Musculoskeletal:         General: No swelling or tenderness  Neurological: She is alert  Skin: Lesion and rash noted  There is erythema     face

## 2020-11-11 ENCOUNTER — OFFICE VISIT (OUTPATIENT)
Dept: FAMILY MEDICINE CLINIC | Facility: CLINIC | Age: 40
End: 2020-11-11
Payer: COMMERCIAL

## 2020-11-11 VITALS
SYSTOLIC BLOOD PRESSURE: 120 MMHG | BODY MASS INDEX: 32.19 KG/M2 | HEART RATE: 85 BPM | DIASTOLIC BLOOD PRESSURE: 70 MMHG | WEIGHT: 193.2 LBS | OXYGEN SATURATION: 100 % | TEMPERATURE: 97.2 F | HEIGHT: 65 IN

## 2020-11-11 DIAGNOSIS — Z00.00 ANNUAL PHYSICAL EXAM: Primary | ICD-10-CM

## 2020-11-11 DIAGNOSIS — Z11.4 ENCOUNTER FOR SCREENING FOR HIV: ICD-10-CM

## 2020-11-11 PROCEDURE — 3725F SCREEN DEPRESSION PERFORMED: CPT | Performed by: FAMILY MEDICINE

## 2020-11-11 PROCEDURE — 3008F BODY MASS INDEX DOCD: CPT | Performed by: FAMILY MEDICINE

## 2020-11-11 PROCEDURE — 99395 PREV VISIT EST AGE 18-39: CPT | Performed by: FAMILY MEDICINE

## 2020-11-11 PROCEDURE — 1036F TOBACCO NON-USER: CPT | Performed by: FAMILY MEDICINE

## 2021-03-31 DIAGNOSIS — Z23 ENCOUNTER FOR IMMUNIZATION: ICD-10-CM

## 2021-11-09 ENCOUNTER — OFFICE VISIT (OUTPATIENT)
Dept: PODIATRY | Facility: CLINIC | Age: 41
End: 2021-11-09
Payer: COMMERCIAL

## 2021-11-09 VITALS
HEART RATE: 86 BPM | BODY MASS INDEX: 32.49 KG/M2 | HEIGHT: 65 IN | SYSTOLIC BLOOD PRESSURE: 111 MMHG | WEIGHT: 195 LBS | DIASTOLIC BLOOD PRESSURE: 77 MMHG

## 2021-11-09 DIAGNOSIS — M79.672 PAIN IN BOTH FEET: ICD-10-CM

## 2021-11-09 DIAGNOSIS — M24.874 OTH SPECIFIC JOINT DERANGEMENTS OF RIGHT FOOT, NEC: ICD-10-CM

## 2021-11-09 DIAGNOSIS — M79.671 PAIN IN BOTH FEET: ICD-10-CM

## 2021-11-09 DIAGNOSIS — M24.875 OTHER SPECIFIC JOINT DERANGEMENTS LEFT FOOT, NOT ELSEWHERE CLASSIFIED: ICD-10-CM

## 2021-11-09 DIAGNOSIS — L85.1 ACQUIRED KERATODERMA: ICD-10-CM

## 2021-11-09 DIAGNOSIS — M72.2 PLANTAR FASCIITIS: Primary | ICD-10-CM

## 2021-11-09 DIAGNOSIS — G57.81 OTHER SPECIFIED MONONEUROPATHIES OF RIGHT LOWER LIMB: ICD-10-CM

## 2021-11-09 PROCEDURE — 11056 PARNG/CUTG B9 HYPRKR LES 2-4: CPT | Performed by: PODIATRIST

## 2021-11-09 PROCEDURE — 99204 OFFICE O/P NEW MOD 45 MIN: CPT | Performed by: PODIATRIST

## 2021-12-07 ENCOUNTER — EVALUATION (OUTPATIENT)
Dept: PHYSICAL THERAPY | Facility: REHABILITATION | Age: 41
End: 2021-12-07
Payer: COMMERCIAL

## 2021-12-07 DIAGNOSIS — G89.29 HEEL PAIN, CHRONIC, RIGHT: Primary | ICD-10-CM

## 2021-12-07 DIAGNOSIS — M72.2 PLANTAR FASCIITIS: ICD-10-CM

## 2021-12-07 DIAGNOSIS — M79.671 HEEL PAIN, CHRONIC, RIGHT: Primary | ICD-10-CM

## 2021-12-07 PROCEDURE — 97112 NEUROMUSCULAR REEDUCATION: CPT | Performed by: PHYSICAL THERAPIST

## 2021-12-07 PROCEDURE — 97163 PT EVAL HIGH COMPLEX 45 MIN: CPT | Performed by: PHYSICAL THERAPIST

## 2021-12-14 ENCOUNTER — OFFICE VISIT (OUTPATIENT)
Dept: PHYSICAL THERAPY | Facility: REHABILITATION | Age: 41
End: 2021-12-14
Payer: COMMERCIAL

## 2021-12-14 DIAGNOSIS — M72.2 PLANTAR FASCIITIS: Primary | ICD-10-CM

## 2021-12-14 DIAGNOSIS — G89.29 HEEL PAIN, CHRONIC, RIGHT: ICD-10-CM

## 2021-12-14 DIAGNOSIS — M79.671 HEEL PAIN, CHRONIC, RIGHT: ICD-10-CM

## 2021-12-14 PROCEDURE — 97112 NEUROMUSCULAR REEDUCATION: CPT | Performed by: PHYSICAL THERAPIST

## 2021-12-21 ENCOUNTER — OFFICE VISIT (OUTPATIENT)
Dept: PHYSICAL THERAPY | Facility: REHABILITATION | Age: 41
End: 2021-12-21
Payer: COMMERCIAL

## 2021-12-21 DIAGNOSIS — M72.2 PLANTAR FASCIITIS: Primary | ICD-10-CM

## 2021-12-21 DIAGNOSIS — G89.29 HEEL PAIN, CHRONIC, RIGHT: ICD-10-CM

## 2021-12-21 DIAGNOSIS — M79.671 HEEL PAIN, CHRONIC, RIGHT: ICD-10-CM

## 2021-12-21 PROCEDURE — 97140 MANUAL THERAPY 1/> REGIONS: CPT | Performed by: PHYSICAL THERAPIST

## 2021-12-21 PROCEDURE — 97112 NEUROMUSCULAR REEDUCATION: CPT | Performed by: PHYSICAL THERAPIST

## 2021-12-30 ENCOUNTER — OFFICE VISIT (OUTPATIENT)
Dept: PHYSICAL THERAPY | Facility: REHABILITATION | Age: 41
End: 2021-12-30
Payer: COMMERCIAL

## 2021-12-30 DIAGNOSIS — G89.29 HEEL PAIN, CHRONIC, RIGHT: ICD-10-CM

## 2021-12-30 DIAGNOSIS — M79.671 HEEL PAIN, CHRONIC, RIGHT: ICD-10-CM

## 2021-12-30 DIAGNOSIS — M72.2 PLANTAR FASCIITIS: Primary | ICD-10-CM

## 2021-12-30 PROCEDURE — 97112 NEUROMUSCULAR REEDUCATION: CPT

## 2021-12-30 PROCEDURE — 97110 THERAPEUTIC EXERCISES: CPT

## 2021-12-30 PROCEDURE — 97140 MANUAL THERAPY 1/> REGIONS: CPT

## 2022-01-03 ENCOUNTER — OFFICE VISIT (OUTPATIENT)
Dept: PHYSICAL THERAPY | Facility: REHABILITATION | Age: 42
End: 2022-01-03
Payer: COMMERCIAL

## 2022-01-03 DIAGNOSIS — M72.2 PLANTAR FASCIITIS: ICD-10-CM

## 2022-01-03 DIAGNOSIS — M79.671 HEEL PAIN, CHRONIC, RIGHT: Primary | ICD-10-CM

## 2022-01-03 DIAGNOSIS — G89.29 HEEL PAIN, CHRONIC, RIGHT: Primary | ICD-10-CM

## 2022-01-03 PROCEDURE — 97112 NEUROMUSCULAR REEDUCATION: CPT | Performed by: PHYSICAL THERAPIST

## 2022-01-03 PROCEDURE — 97140 MANUAL THERAPY 1/> REGIONS: CPT | Performed by: PHYSICAL THERAPIST

## 2022-01-03 NOTE — PROGRESS NOTES
Daily Note     Today's date: 1/3/2022  Patient name: Bharathi Riggs  : 1980  MRN: [id number]  Referring provider: Kari House DPM  Dx:   Encounter Diagnosis     ICD-10-CM    1  Heel pain, chronic, right  M79 671     G89 29    2  Plantar fasciitis  M72 2        Start Time: 0700  Stop Time: 4379  Total time in clinic (min): 41 minutes    Subjective: Patient reports that she continues to have less pain since having her callus worked on and since starting PT  States that work has been better, less pain standing and walking without a limp  Objective: See treatment diary below  + gastroc shortness  Assessment: Tolerated treatment well  Patient with noted + gastroc shortness evident upon >10 deg improvement in DF with knee bent  She continues to improve with her exercsie tolerance as she was able to increase reps with SL HR and add SLS with short foot  educated on how to perform self STM to gastroc for HEP  Plan: Continue per plan of care        Precautions: cervical CA (h/o)      Manuals 12/7 12/14 12/21 12/29 1/3        Calc eversion taping AB AB AB WV         Mobs prn             STM R ant tib  5' 5' np         STM R gastroc     3'        TCJ AP mobs    5' distraction G4        Neuro Re-Ed             Active ankle DF/eversion and PF inv  OTB  3x10 ea GTB  30x ea GTB 3x10 HEP        Nerve glides prn             Toe Yoga    x20 ea         Standing short foot  10"x10 standing today: 5"x10 5"x20ea 10"x10        HEP education 5' 3' 3'          HR    5x5 2x10 19+15        SLS     5"x10 ea side                     Ther Ex             Seated gastroc stretch     15"x5 (standingNV)        REIL 5+10            Self STM with tennis ball and active DF/PF     2'                                                                         Ther Activity                                       Gait Training                                       Modalities

## 2022-01-07 ENCOUNTER — APPOINTMENT (OUTPATIENT)
Dept: PHYSICAL THERAPY | Facility: REHABILITATION | Age: 42
End: 2022-01-07
Payer: COMMERCIAL

## 2022-01-10 ENCOUNTER — OFFICE VISIT (OUTPATIENT)
Dept: PHYSICAL THERAPY | Facility: REHABILITATION | Age: 42
End: 2022-01-10
Payer: COMMERCIAL

## 2022-01-10 DIAGNOSIS — M79.671 HEEL PAIN, CHRONIC, RIGHT: Primary | ICD-10-CM

## 2022-01-10 DIAGNOSIS — G89.29 HEEL PAIN, CHRONIC, RIGHT: Primary | ICD-10-CM

## 2022-01-10 DIAGNOSIS — M72.2 PLANTAR FASCIITIS: ICD-10-CM

## 2022-01-10 PROCEDURE — 97112 NEUROMUSCULAR REEDUCATION: CPT | Performed by: PHYSICAL THERAPIST

## 2022-01-10 PROCEDURE — 97110 THERAPEUTIC EXERCISES: CPT | Performed by: PHYSICAL THERAPIST

## 2022-01-10 NOTE — PROGRESS NOTES
Daily Note     Today's date: 1/10/2022  Patient name: David Augustine  : 1980  MRN: [id number]  Referring provider: Nanci Jama DPM  Dx:   Encounter Diagnosis     ICD-10-CM    1  Heel pain, chronic, right  M79 671     G89 29    2  Plantar fasciitis  M72 2        Start Time: 0700  Stop Time: 0740  Total time in clinic (min): 40 minutes    Subjective: Patient reports that work last week had gone well, still getting some heel pan by the end of her shift  Objective: See treatment diary below  Normalized segmental foot mobility       Assessment: Tolerated treatment well  Patient cued for form with short foot exercises as well as HR  Educated on importance of HEP and continued loading exercies for injury prevention  We also discussed the use of orthotics and supportive shoe wear, mainly for medial arch  Plan: Continue per plan of care        Precautions: cervical CA (h/o)      Manuals 12/7 12/14 12/21 12/29 1/3 1/10       Calc eversion taping AB AB AB RI         Mobs prn             STM R ant tib  5' 5' np         assessment      5'       STM R gastroc     3'        TCJ AP mobs    5' distraction G4        Neuro Re-Ed             Active ankle DF/eversion and PF inv  OTB  3x10 ea GTB  30x ea GTB 3x10 HEP        Nerve glides prn             Toe Yoga    x20 ea         Standing short foot  10"x10 standing today: 5"x10 5"x20ea 10"x10 10"x5 DL  10"x5 SL  5"x5 with 10# KB and ant WS       HEP education 5' 3' 3'   5'       HR    5x5 2x10 19+15 3x15       SLS     5"x10 ea side        Soleus raises      2x15  35#       Ther Ex             Seated gastroc/soleus stretch     15"x5 (standingNV) standing 15"x2 ea       REIL 5+10            Self STM with tennis ball and active DF/PF     2'                                                                         Ther Activity                                       Gait Training                                       Modalities

## 2022-01-17 ENCOUNTER — APPOINTMENT (OUTPATIENT)
Dept: PHYSICAL THERAPY | Facility: REHABILITATION | Age: 42
End: 2022-01-17
Payer: COMMERCIAL

## 2022-04-20 ENCOUNTER — VBI (OUTPATIENT)
Dept: ADMINISTRATIVE | Facility: OTHER | Age: 42
End: 2022-04-20

## 2022-04-26 ENCOUNTER — OFFICE VISIT (OUTPATIENT)
Dept: FAMILY MEDICINE CLINIC | Facility: CLINIC | Age: 42
End: 2022-04-26
Payer: COMMERCIAL

## 2022-04-26 VITALS
OXYGEN SATURATION: 100 % | WEIGHT: 188.8 LBS | HEART RATE: 78 BPM | TEMPERATURE: 97.7 F | SYSTOLIC BLOOD PRESSURE: 122 MMHG | DIASTOLIC BLOOD PRESSURE: 80 MMHG | BODY MASS INDEX: 31.46 KG/M2 | RESPIRATION RATE: 16 BRPM | HEIGHT: 65 IN

## 2022-04-26 DIAGNOSIS — R61 NIGHT SWEATS: ICD-10-CM

## 2022-04-26 DIAGNOSIS — R42 DIZZINESS: Primary | ICD-10-CM

## 2022-04-26 DIAGNOSIS — G43.009 MIGRAINE WITHOUT AURA AND WITHOUT STATUS MIGRAINOSUS, NOT INTRACTABLE: ICD-10-CM

## 2022-04-26 PROCEDURE — 99214 OFFICE O/P EST MOD 30 MIN: CPT | Performed by: FAMILY MEDICINE

## 2022-04-26 NOTE — PROGRESS NOTES
Assessment/Plan:    No problem-specific Assessment & Plan notes found for this encounter  Diagnoses and all orders for this visit:    Dizziness  -     CBC and differential  -     Comprehensive metabolic panel  -     Vitamin B12; Future  -     Vitamin D 25 hydroxy; Future  -     TSH, 3rd generation with Free T4 reflex; Future  -     Iron Panel (Includes Ferritin, Iron Sat%, Iron, and TIBC); Future  -     C-reactive protein; Future    Night sweats  -     CBC and differential  -     Comprehensive metabolic panel  -     Vitamin B12; Future  -     Vitamin D 25 hydroxy; Future  -     TSH, 3rd generation with Free T4 reflex; Future  -     Iron Panel (Includes Ferritin, Iron Sat%, Iron, and TIBC); Future  -     C-reactive protein; Future    Migraine without aura and without status migrainosus, not intractable  -     magnesium oxide (MAG-OX) 400 mg; Take 1 tablet (400 mg total) by mouth daily  -     Riboflavin 100 MG TABS; Take 4 tablets (400 mg total) by mouth in the morning        Subjective:      Patient ID: Aubrey Castillo is a 46 y o  female  HPI     Dizziness on and off for the last few months   It can happen at anytime and no triggers  Night sweats just about every night for the last 1 week   Periods are regular   still having headaches and not taking her vitamins       The following portions of the patient's history were reviewed and updated as appropriate: allergies, current medications, past family history, past medical history, past social history, past surgical history and problem list     Review of Systems   Constitutional: Negative  HENT: Negative  Respiratory: Negative  Cardiovascular: Negative  Genitourinary: Negative  Musculoskeletal: Negative  Neurological: Positive for dizziness and weakness  Negative for tremors and syncope  Hematological: Negative  Psychiatric/Behavioral: Negative            Objective:      /80 (BP Location: Left arm, Patient Position: Sitting, Cuff Size: Adult)   Pulse 78   Temp 97 7 °F (36 5 °C) (Skin)   Resp 16   Ht 5' 5" (1 651 m)   Wt 85 6 kg (188 lb 12 8 oz)   SpO2 100%   BMI 31 42 kg/m²          Physical Exam  Constitutional:       Appearance: Normal appearance  Cardiovascular:      Rate and Rhythm: Normal rate and regular rhythm  Pulses: Normal pulses  Heart sounds: Normal heart sounds  Pulmonary:      Effort: Pulmonary effort is normal       Breath sounds: Normal breath sounds  Neurological:      General: No focal deficit present  Mental Status: She is alert and oriented to person, place, and time     Psychiatric:         Mood and Affect: Mood normal          Behavior: Behavior normal

## 2022-04-30 ENCOUNTER — APPOINTMENT (OUTPATIENT)
Dept: LAB | Facility: HOSPITAL | Age: 42
End: 2022-04-30
Payer: COMMERCIAL

## 2022-04-30 DIAGNOSIS — R42 DIZZINESS: ICD-10-CM

## 2022-04-30 DIAGNOSIS — R61 NIGHT SWEATS: ICD-10-CM

## 2022-04-30 LAB
25(OH)D3 SERPL-MCNC: 17.4 NG/ML (ref 30–100)
ALBUMIN SERPL BCP-MCNC: 3.7 G/DL (ref 3.5–5)
ALP SERPL-CCNC: 73 U/L (ref 46–116)
ALT SERPL W P-5'-P-CCNC: 23 U/L (ref 12–78)
ANION GAP SERPL CALCULATED.3IONS-SCNC: 6 MMOL/L (ref 4–13)
AST SERPL W P-5'-P-CCNC: 14 U/L (ref 5–45)
BASOPHILS # BLD AUTO: 0.05 THOUSANDS/ΜL (ref 0–0.1)
BASOPHILS NFR BLD AUTO: 1 % (ref 0–1)
BILIRUB SERPL-MCNC: 0.72 MG/DL (ref 0.2–1)
BUN SERPL-MCNC: 13 MG/DL (ref 5–25)
CALCIUM SERPL-MCNC: 9.3 MG/DL (ref 8.3–10.1)
CHLORIDE SERPL-SCNC: 104 MMOL/L (ref 100–108)
CO2 SERPL-SCNC: 28 MMOL/L (ref 21–32)
CREAT SERPL-MCNC: 0.84 MG/DL (ref 0.6–1.3)
CRP SERPL QL: <3 MG/L
EOSINOPHIL # BLD AUTO: 0.18 THOUSAND/ΜL (ref 0–0.61)
EOSINOPHIL NFR BLD AUTO: 3 % (ref 0–6)
ERYTHROCYTE [DISTWIDTH] IN BLOOD BY AUTOMATED COUNT: 12.5 % (ref 11.6–15.1)
GFR SERPL CREATININE-BSD FRML MDRD: 87 ML/MIN/1.73SQ M
GLUCOSE P FAST SERPL-MCNC: 101 MG/DL (ref 65–99)
HCT VFR BLD AUTO: 41.5 % (ref 34.8–46.1)
HGB BLD-MCNC: 13.6 G/DL (ref 11.5–15.4)
IMM GRANULOCYTES # BLD AUTO: 0.01 THOUSAND/UL (ref 0–0.2)
IMM GRANULOCYTES NFR BLD AUTO: 0 % (ref 0–2)
LYMPHOCYTES # BLD AUTO: 2.36 THOUSANDS/ΜL (ref 0.6–4.47)
LYMPHOCYTES NFR BLD AUTO: 32 % (ref 14–44)
MCH RBC QN AUTO: 30.6 PG (ref 26.8–34.3)
MCHC RBC AUTO-ENTMCNC: 32.8 G/DL (ref 31.4–37.4)
MCV RBC AUTO: 94 FL (ref 82–98)
MONOCYTES # BLD AUTO: 0.5 THOUSAND/ΜL (ref 0.17–1.22)
MONOCYTES NFR BLD AUTO: 7 % (ref 4–12)
NEUTROPHILS # BLD AUTO: 4.24 THOUSANDS/ΜL (ref 1.85–7.62)
NEUTS SEG NFR BLD AUTO: 57 % (ref 43–75)
NRBC BLD AUTO-RTO: 0 /100 WBCS
PLATELET # BLD AUTO: 284 THOUSANDS/UL (ref 149–390)
PMV BLD AUTO: 11.7 FL (ref 8.9–12.7)
POTASSIUM SERPL-SCNC: 3.9 MMOL/L (ref 3.5–5.3)
PROT SERPL-MCNC: 7.4 G/DL (ref 6.4–8.2)
RBC # BLD AUTO: 4.44 MILLION/UL (ref 3.81–5.12)
SODIUM SERPL-SCNC: 138 MMOL/L (ref 136–145)
TSH SERPL DL<=0.05 MIU/L-ACNC: 1.79 UIU/ML (ref 0.45–4.5)
VIT B12 SERPL-MCNC: 345 PG/ML (ref 100–900)
WBC # BLD AUTO: 7.34 THOUSAND/UL (ref 4.31–10.16)

## 2022-04-30 PROCEDURE — 36415 COLL VENOUS BLD VENIPUNCTURE: CPT

## 2022-04-30 PROCEDURE — 84443 ASSAY THYROID STIM HORMONE: CPT

## 2022-04-30 PROCEDURE — 80053 COMPREHEN METABOLIC PANEL: CPT | Performed by: FAMILY MEDICINE

## 2022-04-30 PROCEDURE — 82607 VITAMIN B-12: CPT

## 2022-04-30 PROCEDURE — 82306 VITAMIN D 25 HYDROXY: CPT

## 2022-04-30 PROCEDURE — 86140 C-REACTIVE PROTEIN: CPT

## 2022-04-30 PROCEDURE — 85025 COMPLETE CBC W/AUTO DIFF WBC: CPT | Performed by: FAMILY MEDICINE

## 2022-06-08 ENCOUNTER — ANNUAL EXAM (OUTPATIENT)
Dept: OBGYN CLINIC | Facility: CLINIC | Age: 42
End: 2022-06-08
Payer: COMMERCIAL

## 2022-06-08 VITALS
DIASTOLIC BLOOD PRESSURE: 72 MMHG | BODY MASS INDEX: 31.06 KG/M2 | WEIGHT: 186.4 LBS | SYSTOLIC BLOOD PRESSURE: 110 MMHG | HEIGHT: 65 IN

## 2022-06-08 DIAGNOSIS — Z12.31 SCREENING MAMMOGRAM FOR BREAST CANCER: ICD-10-CM

## 2022-06-08 DIAGNOSIS — Z11.3 SCREENING EXAMINATION FOR STD (SEXUALLY TRANSMITTED DISEASE): ICD-10-CM

## 2022-06-08 DIAGNOSIS — Z91.89 NEED FOR DENTAL CARE: ICD-10-CM

## 2022-06-08 DIAGNOSIS — Z01.419 WOMEN'S ANNUAL ROUTINE GYNECOLOGICAL EXAMINATION: Primary | ICD-10-CM

## 2022-06-08 PROCEDURE — G0476 HPV COMBO ASSAY CA SCREEN: HCPCS | Performed by: NURSE PRACTITIONER

## 2022-06-08 PROCEDURE — 87591 N.GONORRHOEAE DNA AMP PROB: CPT | Performed by: NURSE PRACTITIONER

## 2022-06-08 PROCEDURE — 0503F POSTPARTUM CARE VISIT: CPT | Performed by: NURSE PRACTITIONER

## 2022-06-08 PROCEDURE — G0145 SCR C/V CYTO,THINLAYER,RESCR: HCPCS | Performed by: NURSE PRACTITIONER

## 2022-06-08 PROCEDURE — 99385 PREV VISIT NEW AGE 18-39: CPT | Performed by: NURSE PRACTITIONER

## 2022-06-08 PROCEDURE — 87491 CHLMYD TRACH DNA AMP PROBE: CPT | Performed by: NURSE PRACTITIONER

## 2022-06-08 NOTE — PROGRESS NOTES
Subjective    HPI:     Marquita Timmons is a 46 y o  female  She is a  3 Para 3, with  x 3  Her menstrual cycles are regular and predictable  Her current method of contraception includes tubal ligation    She denies issues with intimacy  She denies /GI and Gyn complaints  Desires STD screening   recent diagnosed with genital herpes  She feels safe at home  She suffers from  depression which she states she is able to  control by keeping busy  Medical, surgical and family history reviewed  Her dental care is not done - 2 years ago was her last cleaning  She is having a hard time finding a place that takes her insurance  She eats a healthy diet and exercises regularly  She is happy with her weight  Gynecologic History    Patient's last menstrual period was 2022  Last Pap: 16  Results were: normal  History of CIN2 in  and LEEP procedure  Last mammogram: Needs baseline this year    Obstetric History    OB History    Para Term  AB Living   3 3       3   SAB IAB Ectopic Multiple Live Births           3      # Outcome Date GA Lbr Blue/2nd Weight Sex Delivery Anes PTL Lv   3 Para            2 Para            1 Para                The following portions of the patient's history were reviewed and updated as appropriate: allergies, current medications, past family history, past medical history, past social history, past surgical history and problem list     Review of Systems    Pertinent items are noted in HPI  Objective    Physical Exam  Constitutional:       Appearance: Normal appearance  She is well-developed  Genitourinary:      Vulva, bladder and urethral meatus normal       No lesions in the vagina  Right Labia: No rash, tenderness, lesions, skin changes or Bartholin's cyst      Left Labia: No tenderness, lesions, skin changes, Bartholin's cyst or rash  No labial fusion noted  No inguinal adenopathy present in the right or left side       No vaginal discharge, erythema, tenderness, bleeding or granulation tissue  No vaginal prolapse present  No vaginal atrophy present  Right Adnexa: not tender, not full and no mass present  Left Adnexa: not tender, not full and no mass present  Cervix is parous  Cervical friability present  No cervical motion tenderness, discharge, lesion, polyp or nabothian cyst       Uterus is not enlarged, tender, irregular or prolapsed  No uterine mass detected  Uterus is anteverted  Pelvic exam was performed with patient in the lithotomy position  Breasts: Breasts are symmetrical       Right: No inverted nipple, mass, nipple discharge, skin change, tenderness, axillary adenopathy or supraclavicular adenopathy  Left: No inverted nipple, mass, nipple discharge, skin change, tenderness, axillary adenopathy or supraclavicular adenopathy  HENT:      Head: Normocephalic and atraumatic  Neck:      Thyroid: No thyromegaly  Cardiovascular:      Rate and Rhythm: Normal rate and regular rhythm  Heart sounds: Normal heart sounds, S1 normal and S2 normal    Pulmonary:      Effort: Pulmonary effort is normal       Breath sounds: Normal breath sounds  Abdominal:      General: Bowel sounds are normal  There is no distension  Palpations: Abdomen is soft  There is no mass  Tenderness: There is no abdominal tenderness  There is no guarding  Hernia: There is no hernia in the left inguinal area or right inguinal area  Musculoskeletal:      Cervical back: Neck supple  Lymphadenopathy:      Cervical: No cervical adenopathy  Upper Body:      Right upper body: No supraclavicular or axillary adenopathy  Left upper body: No supraclavicular or axillary adenopathy  Lower Body: No right inguinal adenopathy  No left inguinal adenopathy  Neurological:      General: No focal deficit present        Mental Status: She is alert and oriented to person, place, and time    Skin:     General: Skin is warm and dry  Findings: No rash  Psychiatric:         Attention and Perception: Attention and perception normal          Mood and Affect: Mood and affect normal          Speech: Speech normal          Behavior: Behavior is cooperative  Thought Content: Thought content normal          Cognition and Memory: Cognition and memory normal          Judgment: Judgment normal    Vitals and nursing note reviewed  Assessment and Plan    Herson Krause was seen today for gynecologic exam     Diagnoses and all orders for this visit:    Women's annual routine gynecological examination  -     Liquid-based pap, screening    Screening examination for STD (sexually transmitted disease)  -     Chlamydia/GC amplified DNA by PCR  -     Herpes I/II IgG Antibodies  -     Hepatitis panel, acute  -     HIV 1/2 Antigen/Antibody (4th Generation) w Reflex [facility]  -     RPR    Screening mammogram for breast cancer  -     Mammo screening bilateral w 3d & cad; Future    Need for dental care  -     Ambulatory Referral to Dentistry; Future      Patient informed of a Stable GYN exam  A pap smear was performed  I have discussed the importance of exercise and healthy diet as well as adequate intake of calcium and vitamin D  The current ASCCP guidelines were reviewed  The low risk patient will receive pap smear screening every 3 years until the age of 34 and then every 3 to 5 years with HPV co-testing from the ages of 33-67  I emphasized the importance of an annual pelvic and breast exam  A yearly mammogram is recommended for breast cancer screening starting at age 36  A mammogram script has been provided for her to schedule her first mammogram in August after her 42th Birthday  Results will be released to [facility], if abnormal will call to review and discuss treatment plan  All questions have been answered to her satisfaction  Mammogram ordered    Follow up in: 1 year or sooner if needed  Garett Oconnor

## 2022-06-09 LAB
HPV HR 12 DNA CVX QL NAA+PROBE: NEGATIVE
HPV16 DNA CVX QL NAA+PROBE: NEGATIVE
HPV18 DNA CVX QL NAA+PROBE: NEGATIVE

## 2022-06-10 LAB
C TRACH DNA SPEC QL NAA+PROBE: NEGATIVE
N GONORRHOEA DNA SPEC QL NAA+PROBE: NEGATIVE

## 2022-06-18 ENCOUNTER — APPOINTMENT (OUTPATIENT)
Dept: LAB | Facility: HOSPITAL | Age: 42
End: 2022-06-18
Payer: COMMERCIAL

## 2022-06-18 LAB
HAV IGM SER QL: NORMAL
HBV CORE IGM SER QL: NORMAL
HBV SURFACE AG SER QL: NORMAL
HCV AB SER QL: NORMAL

## 2022-06-18 PROCEDURE — 87389 HIV-1 AG W/HIV-1&-2 AB AG IA: CPT | Performed by: NURSE PRACTITIONER

## 2022-06-18 PROCEDURE — 86695 HERPES SIMPLEX TYPE 1 TEST: CPT | Performed by: NURSE PRACTITIONER

## 2022-06-18 PROCEDURE — 80074 ACUTE HEPATITIS PANEL: CPT | Performed by: NURSE PRACTITIONER

## 2022-06-18 PROCEDURE — 36415 COLL VENOUS BLD VENIPUNCTURE: CPT | Performed by: NURSE PRACTITIONER

## 2022-06-18 PROCEDURE — 86592 SYPHILIS TEST NON-TREP QUAL: CPT | Performed by: NURSE PRACTITIONER

## 2022-06-18 PROCEDURE — 86696 HERPES SIMPLEX TYPE 2 TEST: CPT | Performed by: NURSE PRACTITIONER

## 2022-06-19 LAB — HIV 1+2 AB+HIV1 P24 AG SERPL QL IA: NORMAL

## 2022-06-20 LAB
HSV1 IGG SER IA-ACNC: 45.8 INDEX (ref 0–0.9)
HSV2 IGG SER IA-ACNC: 9.49 INDEX (ref 0–0.9)
LAB AP GYN PRIMARY INTERPRETATION: NORMAL
LAB AP LMP: NORMAL
Lab: NORMAL
PATH INTERP SPEC-IMP: NORMAL
RPR SER QL: NORMAL

## 2022-06-28 ENCOUNTER — OFFICE VISIT (OUTPATIENT)
Dept: FAMILY MEDICINE CLINIC | Facility: CLINIC | Age: 42
End: 2022-06-28
Payer: COMMERCIAL

## 2022-06-28 ENCOUNTER — TELEPHONE (OUTPATIENT)
Dept: PSYCHIATRY | Facility: CLINIC | Age: 42
End: 2022-06-28

## 2022-06-28 VITALS
HEIGHT: 65 IN | SYSTOLIC BLOOD PRESSURE: 100 MMHG | BODY MASS INDEX: 31.09 KG/M2 | TEMPERATURE: 98.6 F | DIASTOLIC BLOOD PRESSURE: 70 MMHG | RESPIRATION RATE: 16 BRPM | WEIGHT: 186.6 LBS | OXYGEN SATURATION: 98 % | HEART RATE: 75 BPM

## 2022-06-28 DIAGNOSIS — L71.9 ROSACEA: ICD-10-CM

## 2022-06-28 DIAGNOSIS — F33.42 RECURRENT MAJOR DEPRESSIVE DISORDER, IN FULL REMISSION (HCC): ICD-10-CM

## 2022-06-28 DIAGNOSIS — G43.009 MIGRAINE WITHOUT AURA AND WITHOUT STATUS MIGRAINOSUS, NOT INTRACTABLE: ICD-10-CM

## 2022-06-28 DIAGNOSIS — F41.9 ANXIETY: ICD-10-CM

## 2022-06-28 DIAGNOSIS — Z00.00 ANNUAL PHYSICAL EXAM: Primary | ICD-10-CM

## 2022-06-28 PROCEDURE — 99395 PREV VISIT EST AGE 18-39: CPT | Performed by: FAMILY MEDICINE

## 2022-06-28 RX ORDER — ESCITALOPRAM OXALATE 10 MG/1
10 TABLET ORAL DAILY
Qty: 30 TABLET | Refills: 0 | Status: SHIPPED | OUTPATIENT
Start: 2022-06-28

## 2022-06-28 NOTE — PATIENT INSTRUCTIONS

## 2022-06-28 NOTE — TELEPHONE ENCOUNTER
contact patient in regards to referral  spoke to patient whom requested to be added to the [facility] therapy waitlist

## 2022-06-28 NOTE — PROGRESS NOTES
[address] [facility] PRACTICE    NAME: Tammy Hsieh  AGE: 46 y o  SEX: female  : 1980     DATE: 2022     Assessment and Plan:     Problem List Items Addressed This Visit        Cardiovascular and Mediastinum    Migraine without aura and without status migrainosus, not intractable     Stable   Continue to monitor           Relevant Medications    escitalopram (LEXAPRO) 10 mg tablet       Musculoskeletal and Integument    Rosacea    Relevant Orders    Ambulatory Referral to Dermatology       Other    Depression     Tried wellbutrin and zoloft which didn't help   Trial of lexapro daily            Relevant Medications    escitalopram (LEXAPRO) 10 mg tablet    Other Relevant Orders    Ambulatory Referral to Psychiatry      Other Visit Diagnoses     Annual physical exam    -  Primary    Relevant Orders    Lipid Panel with Direct LDL reflex    Comprehensive metabolic panel    CBC and differential    Anxiety        Relevant Medications    escitalopram (LEXAPRO) 10 mg tablet    Other Relevant Orders    Ambulatory Referral to Psychiatry          Immunizations and preventive care screenings were discussed with patient today  Appropriate education was printed on patient's after visit summary  Counseling:   Alcohol/drug use: discussed moderation in alcohol intake, the recommendations for healthy alcohol use, and avoidance of illicit drug use   Dental Health: discussed importance of regular tooth brushing, flossing, and dental visits   Injury prevention: discussed safety/seat belts, safety helmets, smoke detectors, carbon dioxide detectors, and smoking near bedding or upholstery   Sexual health: discussed sexually transmitted diseases, partner selection, use of condoms, avoidance of unintended pregnancy, and contraceptive alternatives  · Exercise: the importance of regular exercise/physical activity was discussed   Recommend exercise 3-5 times per week for at least 30 minutes  BMI Counseling: Body mass index is 30 72 kg/m²  The BMI is above normal  Nutrition recommendations include decreasing portion sizes and encouraging healthy choices of fruits and vegetables  Exercise recommendations include moderate physical activity 150 minutes/week  No pharmacotherapy was ordered  Rationale for BMI follow-up plan is due to patient being overweight or obese  No follow-ups on file  Chief Complaint:     Chief Complaint   Patient presents with    Physical Exam     Patient is here today for an annual exam       History of Present Illness:     Adult Annual Physical   Patient here for a comprehensive physical exam  The patient reports continued anxiety, depression     Diet and Physical Activity  · Diet/Nutrition: well balanced diet and consuming 3-5 servings of fruits/vegetables daily  · Exercise: walking  Depression Screening  PHQ-2/9 Depression Screening    Little interest or pleasure in doing things: 0 - not at all  Feeling down, depressed, or hopeless: 0 - not at all  Trouble falling or staying asleep, or sleeping too much: 3 - nearly every day  Feeling tired or having little energy: 1 - several days  Poor appetite or overeatin - not at all  Feeling bad about yourself - or that you are a failure or have let yourself or your family down: 0 - not at all  Trouble concentrating on things, such as reading the newspaper or watching television: 0 - not at all  Moving or speaking so slowly that other people could have noticed  Or the opposite - being so fidgety or restless that you have been moving around a lot more than usual: 0 - not at all  Thoughts that you would be better off dead, or of hurting yourself in some way: 0 - not at all  PHQ-9 Score: 4   PHQ-9 Interpretation: No or Minimal depression        General Health  · Sleep: sleeps on and off 4-6 hours/day   · Hearing: normal - bilateral    · Vision: goes for regular eye exams     · Dental: regular dental visits and brushes teeth twice daily  /GYN Health  · Last menstrual period:   · Contraceptive method: none  · History of STDs?: no      Review of Systems:     Review of Systems   Constitutional: Negative  HENT: Negative  Eyes: Negative  Respiratory: Negative  Cardiovascular: Negative  Gastrointestinal: Negative  Endocrine: Negative  Genitourinary: Negative  Musculoskeletal: Negative  Skin: Negative  Allergic/Immunologic: Negative  Neurological: Negative  Hematological: Negative  Psychiatric/Behavioral: Negative         Past Medical History:     Past Medical History:   Diagnosis Date    Abnormal Pap smear of cervix     Cervical cancer (HCC)     HPV (human papilloma virus) infection     Moderate dysplasia of cervix (SALBADOR II)     Last assessed 8/18/2016      Past Surgical History:     Past Surgical History:   Procedure Laterality Date    CERVICAL BIOPSY  W/ LOOP ELECTRODE EXCISION      FOOT SURGERY Right     TONSILLECTOMY AND ADENOIDECTOMY      TUBAL LIGATION        Social History:     Social History     Socioeconomic History    Marital status: Single     Spouse name: None    Number of children: None    Years of education: None    Highest education level: None   Occupational History     Employer: [employer]   Tobacco Use    Smoking status: Current Some Day Smoker     Packs/day: 0 25     Years: 25 00     Pack years: 6 25    Smokeless tobacco: Never Used    Tobacco comment: 1 pk weekly   Substance and Sexual Activity    Alcohol use: No    Drug use: No    Sexual activity: Yes     Partners: Male     Birth control/protection: Female Sterilization   Other Topics Concern    None   Social History Narrative    Lack of exercise     Social Determinants of Health     Financial Resource Strain: Not on file   Food Insecurity: Not on file   Transportation Needs: Not on file   Physical Activity: Not on file   Stress: Not on file   Social Connections: Not on file   Intimate Partner Violence: Not on file   Housing Stability: Not on file      Family History:     Family History   Problem Relation Age of Onset    No Known Problems Mother     Diabetes Father     No Known Problems Sister     No Known Problems Brother     No Known Problems Daughter     No Known Problems Son     Diabetes Maternal Grandmother     Ovarian cancer Maternal Grandmother     Diabetes Paternal Grandmother     Diabetes Maternal Aunt     Breast cancer additional onset Paternal Aunt     Hypertension Family     Ovarian cysts Family       Current Medications:     Current Outpatient Medications   Medication Sig Dispense Refill    escitalopram (LEXAPRO) 10 mg tablet Take 1 tablet (10 mg total) by mouth daily 30 tablet 0     No current facility-administered medications for this visit  Allergies:     No Known Allergies   Physical Exam:     /70 (BP Location: Left arm, Patient Position: Sitting, Cuff Size: Adult)   Pulse 75   Temp 98 6 °F (37 °C) (Tympanic)   Resp 16   Ht 5' 5 35" (1 66 m)   Wt 84 6 kg (186 lb 9 6 oz)   SpO2 98%   BMI 30 72 kg/m²     Physical Exam  Constitutional:       Appearance: She is well-developed  HENT:      Head: Normocephalic and atraumatic  Right Ear: Tympanic membrane normal       Left Ear: Tympanic membrane normal       Nose: Nose normal       Mouth/Throat:      Mouth: Mucous membranes are moist    Eyes:      Pupils: Pupils are equal, round, and reactive to light  Cardiovascular:      Rate and Rhythm: Normal rate and regular rhythm  Pulses: Normal pulses  Heart sounds: Normal heart sounds  Pulmonary:      Effort: Pulmonary effort is normal  No respiratory distress  Breath sounds: Normal breath sounds  No wheezing  Abdominal:      General: Bowel sounds are normal       Palpations: Abdomen is soft  Musculoskeletal:         General: No deformity  Normal range of motion        Cervical back: Normal range of motion and neck supple  Skin:     General: Skin is warm  Capillary Refill: Capillary refill takes less than 2 seconds  Findings: Erythema, lesion and rash present  Comments: Bilateral face, chin   Neurological:      General: No focal deficit present  Mental Status: She is alert and oriented to person, place, and time     Psychiatric:         Mood and Affect: Mood normal          Behavior: Behavior normal           Nina Dhillon MD   [address] [facility]

## 2022-06-29 DIAGNOSIS — N76.0 BV (BACTERIAL VAGINOSIS): Primary | ICD-10-CM

## 2022-06-29 DIAGNOSIS — B96.89 BV (BACTERIAL VAGINOSIS): Primary | ICD-10-CM

## 2022-06-29 RX ORDER — METRONIDAZOLE 500 MG/1
500 TABLET ORAL EVERY 12 HOURS SCHEDULED
Qty: 14 TABLET | Refills: 0 | Status: SHIPPED | OUTPATIENT
Start: 2022-06-29 | End: 2022-07-06

## 2022-09-28 ENCOUNTER — TELEPHONE (OUTPATIENT)
Dept: DERMATOLOGY | Facility: CLINIC | Age: 42
End: 2022-09-28

## 2022-09-28 NOTE — TELEPHONE ENCOUNTER
Patient was a no show for their dermatology appointment today  Called and left a message for patient to call the office if they would like to reschedule

## 2023-01-03 ENCOUNTER — APPOINTMENT (EMERGENCY)
Dept: CT IMAGING | Facility: HOSPITAL | Age: 43
End: 2023-01-03

## 2023-01-03 ENCOUNTER — HOSPITAL ENCOUNTER (EMERGENCY)
Facility: HOSPITAL | Age: 43
Discharge: HOME | End: 2023-01-03
Attending: EMERGENCY MEDICINE

## 2023-01-03 VITALS
RESPIRATION RATE: 18 BRPM | TEMPERATURE: 98.6 F | DIASTOLIC BLOOD PRESSURE: 58 MMHG | HEART RATE: 67 BPM | SYSTOLIC BLOOD PRESSURE: 114 MMHG | OXYGEN SATURATION: 98 %

## 2023-01-03 DIAGNOSIS — E86.0 DEHYDRATION: ICD-10-CM

## 2023-01-03 DIAGNOSIS — R91.1 INCIDENTAL LUNG NODULE: Primary | ICD-10-CM

## 2023-01-03 DIAGNOSIS — R11.0 NAUSEA: ICD-10-CM

## 2023-01-03 LAB
ALBUMIN SERPL BCP-MCNC: 4.8 G/DL (ref 3.5–5)
ALP SERPL-CCNC: 71 U/L (ref 34–104)
ALT SERPL W P-5'-P-CCNC: 13 U/L (ref 7–52)
ANION GAP SERPL CALCULATED.3IONS-SCNC: 11 MMOL/L (ref 4–13)
AST SERPL W P-5'-P-CCNC: 15 U/L (ref 13–39)
BACTERIA UR QL AUTO: ABNORMAL /HPF
BASOPHILS # BLD AUTO: 0.04 THOUSANDS/ÂΜL (ref 0–0.1)
BASOPHILS NFR BLD AUTO: 0 % (ref 0–1)
BILIRUB SERPL-MCNC: 1.76 MG/DL (ref 0.2–1)
BILIRUB UR QL STRIP: NEGATIVE
BUN SERPL-MCNC: 11 MG/DL (ref 5–25)
CALCIUM SERPL-MCNC: 9.9 MG/DL (ref 8.4–10.2)
CHLORIDE SERPL-SCNC: 102 MMOL/L (ref 96–108)
CLARITY UR: CLEAR
CO2 SERPL-SCNC: 22 MMOL/L (ref 21–32)
COLOR UR: ABNORMAL
CREAT SERPL-MCNC: 0.74 MG/DL (ref 0.6–1.3)
EOSINOPHIL # BLD AUTO: 0.12 THOUSAND/ÂΜL (ref 0–0.61)
EOSINOPHIL NFR BLD AUTO: 1 % (ref 0–6)
ERYTHROCYTE [DISTWIDTH] IN BLOOD BY AUTOMATED COUNT: 12.9 % (ref 11.6–15.1)
EXT PREGNANCY TEST URINE: NEGATIVE
EXT. CONTROL: NORMAL
FLUAV RNA RESP QL NAA+PROBE: NEGATIVE
FLUBV RNA RESP QL NAA+PROBE: NEGATIVE
GFR SERPL CREATININE-BSD FRML MDRD: 101 ML/MIN/1.73SQ M
GLUCOSE SERPL-MCNC: 83 MG/DL (ref 65–140)
GLUCOSE UR STRIP-MCNC: NEGATIVE MG/DL
HCT VFR BLD AUTO: 42.7 % (ref 34.8–46.1)
HGB BLD-MCNC: 14.6 G/DL (ref 11.5–15.4)
HGB UR QL STRIP.AUTO: ABNORMAL
IMM GRANULOCYTES # BLD AUTO: 0.06 THOUSAND/UL (ref 0–0.2)
IMM GRANULOCYTES NFR BLD AUTO: 1 % (ref 0–2)
KETONES UR STRIP-MCNC: ABNORMAL MG/DL
LEUKOCYTE ESTERASE UR QL STRIP: NEGATIVE
LIPASE SERPL-CCNC: 20 U/L (ref 11–82)
LYMPHOCYTES # BLD AUTO: 2.28 THOUSANDS/ÂΜL (ref 0.6–4.47)
LYMPHOCYTES NFR BLD AUTO: 21 % (ref 14–44)
MCH RBC QN AUTO: 31.3 PG (ref 26.8–34.3)
MCHC RBC AUTO-ENTMCNC: 34.2 G/DL (ref 31.4–37.4)
MCV RBC AUTO: 92 FL (ref 82–98)
MONOCYTES # BLD AUTO: 0.7 THOUSAND/ÂΜL (ref 0.17–1.22)
MONOCYTES NFR BLD AUTO: 6 % (ref 4–12)
MUCOUS THREADS UR QL AUTO: ABNORMAL
NEUTROPHILS # BLD AUTO: 7.78 THOUSANDS/ÂΜL (ref 1.85–7.62)
NEUTS SEG NFR BLD AUTO: 71 % (ref 43–75)
NITRITE UR QL STRIP: NEGATIVE
NON-SQ EPI CELLS URNS QL MICRO: ABNORMAL /HPF
NRBC BLD AUTO-RTO: 0 /100 WBCS
PH UR STRIP.AUTO: 5.5 [PH]
PLATELET # BLD AUTO: 281 THOUSANDS/UL (ref 149–390)
PMV BLD AUTO: 12.1 FL (ref 8.9–12.7)
POTASSIUM SERPL-SCNC: 3.6 MMOL/L (ref 3.5–5.3)
PROT SERPL-MCNC: 7.9 G/DL (ref 6.4–8.4)
PROT UR STRIP-MCNC: NEGATIVE MG/DL
RBC # BLD AUTO: 4.66 MILLION/UL (ref 3.81–5.12)
RBC #/AREA URNS AUTO: ABNORMAL /HPF
RSV RNA RESP QL NAA+PROBE: NEGATIVE
SARS-COV-2 RNA RESP QL NAA+PROBE: NEGATIVE
SODIUM SERPL-SCNC: 135 MMOL/L (ref 135–147)
SP GR UR STRIP.AUTO: 1.02 (ref 1–1.03)
UROBILINOGEN UR STRIP-ACNC: <2 MG/DL
WBC # BLD AUTO: 10.98 THOUSAND/UL (ref 4.31–10.16)
WBC #/AREA URNS AUTO: ABNORMAL /HPF

## 2023-01-03 RX ORDER — CEPHALEXIN 250 MG/1
500 CAPSULE ORAL ONCE
Status: DISCONTINUED | OUTPATIENT
Start: 2023-01-03 | End: 2023-01-03

## 2023-01-03 RX ORDER — PROMETHAZINE HYDROCHLORIDE 25 MG/1
25 TABLET ORAL EVERY 6 HOURS PRN
Qty: 30 TABLET | Refills: 0 | Status: SHIPPED | OUTPATIENT
Start: 2023-01-03 | End: 2023-01-13

## 2023-01-03 RX ORDER — PROMETHAZINE HYDROCHLORIDE 25 MG/1
25 TABLET ORAL ONCE
Status: COMPLETED | OUTPATIENT
Start: 2023-01-03 | End: 2023-01-03

## 2023-01-03 RX ADMIN — SODIUM CHLORIDE 1000 ML: 0.9 INJECTION, SOLUTION INTRAVENOUS at 09:02

## 2023-01-03 RX ADMIN — PROMETHAZINE HYDROCHLORIDE 25 MG: 25 TABLET ORAL at 09:04

## 2023-01-03 NOTE — ED PROVIDER NOTES
History  Chief Complaint   Patient presents with   • Weakness - Generalized     Pt presents to ED with increased weakness since last Wednesday  Pt was admitted last Wednesday for vomiting  Pt states she has increased weakness since leaving hospital and states she has had decreased appetite and cannot tolerate anything PO     38year-old female with unremarkable past medical history presents for evaluation of nausea x1 week with decreased p o  intake, generalized weakness and fatigue  Patient reports she started feeling unwell about 10 days ago after returning from [facility] her partner who traveled with her started having symptoms of cough/fatigue  She denies developing any cough/cold/fever symptoms however then started having profuse nausea/vomiting with mild diarrhea 7 days ago for which she was admitted to [facility]  Patient states she was discharged home with Rx Zofran which she has taken every 8 hours which has allowed her to tolerate mild p o  intake however still complains of nausea and overall feeling "uneasy "  No dysuria, fever, chest pain, dizziness, headache, back pain, shortness of breath or any other symptoms  She has received x2 COVID vaccines  No other concerns  Prior to Admission Medications   Prescriptions Last Dose Informant Patient Reported?  Taking?   escitalopram (LEXAPRO) 10 mg tablet   No No   Sig: Take 1 tablet (10 mg total) by mouth daily      Facility-Administered Medications: None       Past Medical History:   Diagnosis Date   • Abnormal Pap smear of cervix    • Cervical cancer (HCC)    • HPV (human papilloma virus) infection    • Moderate dysplasia of cervix (SALBADOR II)     Last assessed 8/18/2016       Past Surgical History:   Procedure Laterality Date   • CERVICAL BIOPSY  W/ LOOP ELECTRODE EXCISION     • FOOT SURGERY Right    • TONSILLECTOMY AND ADENOIDECTOMY     • TUBAL LIGATION         Family History   Problem Relation Age of Onset   • No Known Problems Mother    • Diabetes Father    • No Known Problems Sister    • No Known Problems Brother    • No Known Problems Daughter    • No Known Problems Son    • Diabetes Maternal Grandmother    • Ovarian cancer Maternal Grandmother    • Diabetes Paternal Grandmother    • Diabetes Maternal Aunt    • Breast cancer additional onset Paternal Aunt    • Hypertension Family    • Ovarian cysts Family      I have reviewed and agree with the history as documented  E-Cigarette/Vaping     E-Cigarette/Vaping Substances     Social History     Tobacco Use   • Smoking status: Some Days     Packs/day: 0 25     Years: 25 00     Pack years: 6 25     Types: Cigarettes   • Smokeless tobacco: Never   • Tobacco comments:     1 pk weekly   Substance Use Topics   • Alcohol use: No   • Drug use: No        Review of Systems   Constitutional: Positive for activity change, appetite change and fatigue  Negative for chills and fever  HENT: Negative for ear pain and sore throat  Eyes: Negative for pain and visual disturbance  Respiratory: Negative for cough and shortness of breath  Cardiovascular: Negative for chest pain and palpitations  Gastrointestinal: Positive for nausea  Negative for abdominal pain and vomiting  Genitourinary: Negative for dysuria and hematuria  Musculoskeletal: Negative for arthralgias and back pain  Skin: Negative for color change and rash  Neurological: Negative for seizures and syncope  All other systems reviewed and are negative        Physical Exam  ED Triage Vitals [01/03/23 0707]   Temperature Pulse Respirations Blood Pressure SpO2   98 6 °F (37 °C) 74 20 117/64 98 %      Temp Source Heart Rate Source Patient Position - Orthostatic VS BP Location FiO2 (%)   Oral Monitor Sitting Right arm --      Pain Score       --             Orthostatic Vital Signs  Vitals:    01/03/23 0707 01/03/23 1115   BP: 117/64 114/58   Pulse: 74 67   Patient Position - Orthostatic VS: Sitting        Physical Exam  Vitals and nursing note reviewed  Constitutional:       General: She is not in acute distress  Appearance: Normal appearance  She is well-developed  HENT:      Head: Normocephalic and atraumatic  Right Ear: External ear normal       Left Ear: External ear normal       Nose: Nose normal       Mouth/Throat:      Mouth: Mucous membranes are moist       Pharynx: No oropharyngeal exudate or posterior oropharyngeal erythema  Eyes:      Extraocular Movements: Extraocular movements intact  Conjunctiva/sclera: Conjunctivae normal    Cardiovascular:      Rate and Rhythm: Normal rate and regular rhythm  Pulses: Normal pulses  Heart sounds: Normal heart sounds  No murmur heard  No friction rub  Pulmonary:      Effort: Pulmonary effort is normal  No respiratory distress  Breath sounds: Normal breath sounds  Abdominal:      General: Abdomen is flat  Palpations: Abdomen is soft  Tenderness: There is no abdominal tenderness  There is no guarding or rebound  Comments: Negative Galloway sign, no McBurney's point tenderness   Musculoskeletal:         General: No swelling  Normal range of motion  Cervical back: Normal range of motion and neck supple  Right lower leg: No edema  Left lower leg: No edema  Skin:     General: Skin is warm and dry  Capillary Refill: Capillary refill takes less than 2 seconds  Neurological:      General: No focal deficit present  Mental Status: She is alert and oriented to person, place, and time  Mental status is at baseline     Psychiatric:         Mood and Affect: Mood normal          Behavior: Behavior normal          ED Medications  Medications   sodium chloride 0 9 % bolus 1,000 mL (0 mL Intravenous Stopped 1/3/23 1053)   promethazine (PHENERGAN) tablet 25 mg (25 mg Oral Given 1/3/23 4904)       Diagnostic Studies  Results Reviewed     Procedure Component Value Units Date/Time    FLU/RSV/COVID - if FLU/RSV clinically relevant [[id number]] (Normal) Collected: 01/03/23 0906    Lab Status: Final result Specimen: Nares from Nose Updated: 01/03/23 0959     SARS-CoV-2 Negative     INFLUENZA A PCR Negative     INFLUENZA B PCR Negative     RSV PCR Negative    Narrative:      FOR PEDIATRIC PATIENTS - copy/paste COVID Guidelines URL to browser: [email or web address]    SARS-CoV-2 assay is a Nucleic Acid Amplification assay intended for the  qualitative detection of nucleic acid from SARS-CoV-2 in nasopharyngeal  swabs  Results are for the presumptive identification of SARS-CoV-2 RNA  Positive results are indicative of infection with SARS-CoV-2, the virus  causing COVID-19, but do not rule out bacterial infection or co-infection  with other viruses  Laboratories within the [place] and its  territories are required to report all positive results to the appropriate  public health authorities  Negative results do not preclude SARS-CoV-2  infection and should not be used as the sole basis for treatment or other  patient management decisions  Negative results must be combined with  clinical observations, patient history, and epidemiological information  This test has not been FDA cleared or approved  This test has been authorized by FDA under an Emergency Use Authorization  (EUA)  This test is only authorized for the duration of time the  declaration that circumstances exist justifying the authorization of the  emergency use of an in vitro diagnostic tests for detection of SARS-CoV-2  virus and/or diagnosis of COVID-19 infection under section 564(b)(1) of  the Act, 21 U  S C  658GPH-3(A)(1), unless the authorization is terminated  or revoked sooner  The test has been validated but independent review by FDA  and CLIA is pending  Test performed using [employer] GeneXpert: This RT-PCR assay targets N2,  a region unique to SARS-CoV-2   A conserved region in the E-gene was chosen  for pan-Sarbecovirus detection which includes SARS-CoV-2  According to CMS-2020-01-R, this platform meets the definition of high-throughput technology      Comprehensive metabolic panel [[id number]]  (Abnormal) Collected: 01/03/23 0901    Lab Status: Final result Specimen: Blood from Arm, Left Updated: 01/03/23 0940     Sodium 135 mmol/L      Potassium 3 6 mmol/L      Chloride 102 mmol/L      CO2 22 mmol/L      ANION GAP 11 mmol/L      BUN 11 mg/dL      Creatinine 0 74 mg/dL      Glucose 83 mg/dL      Calcium 9 9 mg/dL      AST 15 U/L      ALT 13 U/L      Alkaline Phosphatase 71 U/L      Total Protein 7 9 g/dL      Albumin 4 8 g/dL      Total Bilirubin 1 76 mg/dL      eGFR 101 ml/min/1 73sq m     Narrative:      [place] guidelines for Chronic Kidney Disease (CKD):   •  Stage 1 with normal or high GFR (GFR > 90 mL/min/1 73 square meters)  •  Stage 2 Mild CKD (GFR = 60-89 mL/min/1 73 square meters)  •  Stage 3A Moderate CKD (GFR = 45-59 mL/min/1 73 square meters)  •  Stage 3B Moderate CKD (GFR = 30-44 mL/min/1 73 square meters)  •  Stage 4 Severe CKD (GFR = 15-29 mL/min/1 73 square meters)  •  Stage 5 End Stage CKD (GFR <15 mL/min/1 73 square meters)  Note: GFR calculation is accurate only with a steady state creatinine    Lipase [[id number]]  (Normal) Collected: 01/03/23 0901    Lab Status: Final result Specimen: Blood from Arm, Left Updated: 01/03/23 0940     Lipase 20 u/L     Urine Microscopic [[id number]]  (Abnormal) Collected: 01/03/23 0901    Lab Status: Final result Specimen: Urine, Clean Catch Updated: 01/03/23 0926     RBC, UA 4-10 /hpf      WBC, UA 2-4 /hpf      Epithelial Cells Occasional /hpf      Bacteria, UA None Seen /hpf      MUCUS THREADS Occasional    UA (URINE) with reflex to Scope [[id number]]  (Abnormal) Collected: 01/03/23 0901    Lab Status: Final result Specimen: Urine, Clean Catch Updated: 01/03/23 0916     Color, UA Light Yellow     Clarity, UA Clear     Specific Gravity, UA 1 018     pH, UA 5 5     Leukocytes, UA Negative     Nitrite, UA Negative     Protein, UA Negative mg/dl      Glucose, UA Negative mg/dl      Ketones, UA 40 (2+) mg/dl      Urobilinogen, UA <2 0 mg/dl      Bilirubin, UA Negative     Occult Blood, UA Moderate    CBC and differential [[id number]]  (Abnormal) Collected: 01/03/23 0901    Lab Status: Final result Specimen: Blood from Arm, Left Updated: 01/03/23 0915     WBC 10 98 Thousand/uL      RBC 4 66 Million/uL      Hemoglobin 14 6 g/dL      Hematocrit 42 7 %      MCV 92 fL      MCH 31 3 pg      MCHC 34 2 g/dL      RDW 12 9 %      MPV 12 1 fL      Platelets 584 Thousands/uL      nRBC 0 /100 WBCs      Neutrophils Relative 71 %      Immat GRANS % 1 %      Lymphocytes Relative 21 %      Monocytes Relative 6 %      Eosinophils Relative 1 %      Basophils Relative 0 %      Neutrophils Absolute 7 78 Thousands/µL      Immature Grans Absolute 0 06 Thousand/uL      Lymphocytes Absolute 2 28 Thousands/µL      Monocytes Absolute 0 70 Thousand/µL      Eosinophils Absolute 0 12 Thousand/µL      Basophils Absolute 0 04 Thousands/µL     POCT pregnancy, urine [[id number]]  (Normal) Resulted: 01/03/23 0904    Lab Status: Final result Updated: 01/03/23 0905     EXT Preg Test, Ur Negative     Control Valid                 CT abdomen pelvis wo contrast   Final Result by Mary Ann Leyva MD (01/03 1043)      No acute pathology  No urolithiasis or obstructive uropathy  Incidental 4 mm right middle lobe nodule  Based on current Fleischner Society 2017 Guidelines on incidental pulmonary nodule, optional follow-up CT at 12 months can be considered  Workstation performed: [id number]               Procedures  Procedures      ED Course                                       Medical Decision Making  Patient remained stable through ED course  No episodes of vomiting however reported nausea which was resolved after 25 mg p o  Phenergan    Patient was able to tolerate food and drink without complication in the ED  Work-up including lipase, CBC, CMP was within normal limits  UA showed moderate blood, 4-10 RBCs for which patient underwent a CT abdomen pelvis without contrast and concern for subclinical renal stones causing recurrent nausea/vomiting, however scan was normal aside from incidental lung nodule which was discussed with patient  Patient reported history of marijuana smoking which may be contributing factor of previous vomiting and current nausea  Patient was advised to stop smoking to prevent further exacerbation of nausea  Presenting symptoms most likely related to viral gastritis  She was given Rx for Phenergan for further management of nausea with instructions to follow-up with PCP  Return to ER precautions discussed  Pt understands and agreed with plan  All questions answered  No other concerns  Dehydration: acute illness or injury  Incidental lung nodule: acute illness or injury  Nausea: acute illness or injury  Amount and/or Complexity of Data Reviewed  Labs: ordered  Radiology: ordered  Risk  Prescription drug management  Disposition  Final diagnoses:   Incidental lung nodule   Nausea   Dehydration     Time reflects when diagnosis was documented in both MDM as applicable and the Disposition within this note     Time User Action Codes Description Comment    1/3/2023 10:54 AM Dolph Deeds Add [R91 1] Incidental lung nodule     1/3/2023 10:54 AM Pawel-Larisa Hernández Add [R11 0] Nausea     1/3/2023 10:54 AM Dolph Deeds Add [E86 0] Dehydration       ED Disposition     ED Disposition   Discharge    Condition   Stable    Date/Time   Tue Sammy 3, 2023 11:12 AM    Comment   Daniele López discharge to home/self care                 Follow-up Information     Follow up With Specialties Details Why Contact Info Additional Information    Rutherford Goodpasture, MD Family Medicine Call  As needed [address]  [id number] [place] [id number] [phone]       [facility] Emergency Medicine Go to  As needed, If symptoms worsen [address] [facility] [address] [facility] Emergency Department, [address], [place], [place], [address]          Discharge Medication List as of 1/3/2023 11:13 AM      START taking these medications    Details   promethazine (PHENERGAN) 25 mg tablet Take 1 tablet (25 mg total) by mouth every 6 (six) hours as needed for nausea or vomiting for up to 10 days, Starting Tue 1/3/2023, Until Fri 1/13/2023 at 2359, Normal         CONTINUE these medications which have NOT CHANGED    Details   escitalopram (LEXAPRO) 10 mg tablet Take 1 tablet (10 mg total) by mouth daily, Starting Tue 6/28/2022, Normal           No discharge procedures on file  PDMP Review     None           ED Provider  Attending physically available and evaluated George Russell  TIAN managed the patient along with the ED Attending      Electronically Signed by         Kelsey Santiago MD  01/03/23 000 [address] MD [place]  01/03/23 7584

## 2023-01-04 ENCOUNTER — TRANSITIONAL CARE MANAGEMENT (OUTPATIENT)
Dept: FAMILY MEDICINE CLINIC | Facility: CLINIC | Age: 43
End: 2023-01-04

## 2023-01-04 NOTE — ED ATTENDING ATTESTATION
1/3/2023  IJinny MD, saw and evaluated the patient  I have discussed the patient with the resident/non-physician practitioner and agree with the resident's/non-physician practitioner's findings, Plan of Care, and MDM as documented in the resident's/non-physician practitioner's note, except where noted  All available labs and Radiology studies were reviewed  I was present for key portions of any procedure(s) performed by the resident/non-physician practitioner and I was immediately available to provide assistance  At this point I agree with the current assessment done in the Emergency Department  I have conducted an independent evaluation of this patient a history and physical is as follows:    81-year-old female presenting to emergency department with nausea and decreased appetite  Feels fatigued  Weak  Increase p o  intake  No fevers  No chest pain or shortness of breath  No abdominal pain  Abdomen soft nontender  Agree with work-up, symptomatic treatment, check abdominal labs, urine urine pregnancy    Hematuria noted  Will get study to evaluate for stone  Incidental pulmonary nodule, to follow up with PCP        ED Course         Critical Care Time  Procedures

## 2023-01-06 ENCOUNTER — OFFICE VISIT (OUTPATIENT)
Dept: FAMILY MEDICINE CLINIC | Facility: CLINIC | Age: 43
End: 2023-01-06

## 2023-01-06 VITALS
HEART RATE: 88 BPM | OXYGEN SATURATION: 98 % | SYSTOLIC BLOOD PRESSURE: 120 MMHG | DIASTOLIC BLOOD PRESSURE: 78 MMHG | BODY MASS INDEX: 29.89 KG/M2 | TEMPERATURE: 97.3 F | WEIGHT: 179.4 LBS | RESPIRATION RATE: 16 BRPM | HEIGHT: 65 IN

## 2023-01-06 DIAGNOSIS — R91.1 PULMONARY NODULE: ICD-10-CM

## 2023-01-06 DIAGNOSIS — G43.009 MIGRAINE WITHOUT AURA AND WITHOUT STATUS MIGRAINOSUS, NOT INTRACTABLE: ICD-10-CM

## 2023-01-06 DIAGNOSIS — Z71.6 ENCOUNTER FOR SMOKING CESSATION COUNSELING: ICD-10-CM

## 2023-01-06 DIAGNOSIS — Z09 HOSPITAL DISCHARGE FOLLOW-UP: Primary | ICD-10-CM

## 2023-01-06 DIAGNOSIS — R11.10 INTRACTABLE VOMITING: ICD-10-CM

## 2023-01-06 DIAGNOSIS — F33.42 RECURRENT MAJOR DEPRESSIVE DISORDER, IN FULL REMISSION (HCC): ICD-10-CM

## 2023-01-06 PROBLEM — E86.0 LUETSCHER'S SYNDROME: Status: ACTIVE | Noted: 2022-12-28

## 2023-01-06 RX ORDER — BUPROPION HYDROCHLORIDE 150 MG/1
150 TABLET ORAL EVERY MORNING
Qty: 30 TABLET | Refills: 5 | Status: SHIPPED | OUTPATIENT
Start: 2023-01-06

## 2023-03-07 PROBLEM — E86.0 LUETSCHER'S SYNDROME: Status: RESOLVED | Noted: 2022-12-28 | Resolved: 2023-03-07

## 2023-07-05 ENCOUNTER — OFFICE VISIT (OUTPATIENT)
Dept: FAMILY MEDICINE CLINIC | Facility: CLINIC | Age: 43
End: 2023-07-05
Payer: COMMERCIAL

## 2023-07-05 VITALS
HEART RATE: 80 BPM | OXYGEN SATURATION: 98 % | RESPIRATION RATE: 15 BRPM | WEIGHT: 206.2 LBS | HEIGHT: 65 IN | BODY MASS INDEX: 34.35 KG/M2 | TEMPERATURE: 97.9 F | SYSTOLIC BLOOD PRESSURE: 102 MMHG | DIASTOLIC BLOOD PRESSURE: 60 MMHG

## 2023-07-05 DIAGNOSIS — L71.9 ROSACEA: ICD-10-CM

## 2023-07-05 DIAGNOSIS — Z12.31 ENCOUNTER FOR SCREENING MAMMOGRAM FOR BREAST CANCER: ICD-10-CM

## 2023-07-05 DIAGNOSIS — E66.9 OBESITY (BMI 30-39.9): ICD-10-CM

## 2023-07-05 DIAGNOSIS — G43.009 MIGRAINE WITHOUT AURA AND WITHOUT STATUS MIGRAINOSUS, NOT INTRACTABLE: ICD-10-CM

## 2023-07-05 DIAGNOSIS — Z00.00 ANNUAL PHYSICAL EXAM: Primary | ICD-10-CM

## 2023-07-05 DIAGNOSIS — Z23 ENCOUNTER FOR IMMUNIZATION: ICD-10-CM

## 2023-07-05 DIAGNOSIS — F33.42 RECURRENT MAJOR DEPRESSIVE DISORDER, IN FULL REMISSION (HCC): ICD-10-CM

## 2023-07-05 DIAGNOSIS — Z71.6 ENCOUNTER FOR SMOKING CESSATION COUNSELING: ICD-10-CM

## 2023-07-05 PROBLEM — R11.10 INTRACTABLE VOMITING: Status: RESOLVED | Noted: 2022-12-28 | Resolved: 2023-07-05

## 2023-07-05 PROBLEM — N93.0 PCB (POST COITAL BLEEDING): Status: RESOLVED | Noted: 2018-10-08 | Resolved: 2023-07-05

## 2023-07-05 PROCEDURE — 99396 PREV VISIT EST AGE 40-64: CPT | Performed by: FAMILY MEDICINE

## 2023-07-05 PROCEDURE — 99213 OFFICE O/P EST LOW 20 MIN: CPT | Performed by: FAMILY MEDICINE

## 2023-07-05 RX ORDER — BUPROPION HYDROCHLORIDE 300 MG/1
300 TABLET ORAL EVERY MORNING
Qty: 90 TABLET | Refills: 0 | Status: SHIPPED | OUTPATIENT
Start: 2023-07-05

## 2023-07-05 NOTE — PROGRESS NOTES
[facility] PRACTICE    NAME: Roxanna Prasad  AGE: 38 y.o. SEX: female  : 1980     DATE: 2023     Assessment and Plan:     Problem List Items Addressed This Visit        Cardiovascular and Mediastinum    Migraine without aura and without status migrainosus, not intractable     Stable  Continue to monitor          Relevant Medications    buPROPion (Wellbutrin XL) 300 mg 24 hr tablet       Musculoskeletal and Integument    Rosacea     Using facial lotion daily             Other    Depression     PHQ-2/9 Depression Screening    Little interest or pleasure in doing things: 3 - nearly every day  Feeling down, depressed, or hopeless: 2 - more than half the days  Trouble falling or staying asleep, or sleeping too much: 3 - nearly every day  Feeling tired or having little energy: 3 - nearly every day  Poor appetite or overeating: 3 - nearly every day  Feeling bad about yourself - or that you are a failure or have let yourself or your family down: 2 - more than half the days  Trouble concentrating on things, such as reading the newspaper or watching television: 2 - more than half the days  Moving or speaking so slowly that other people could have noticed.  Or the opposite - being so fidgety or restless that you have been moving around a lot more than usual: 0 - not at all  Thoughts that you would be better off dead, or of hurting yourself in some way: 0 - not at all  PHQ-9 Score: 18   PHQ-9 Interpretation: Moderately severe depression        Restarted on wellbutrin          Relevant Medications    buPROPion (Wellbutrin XL) 300 mg 24 hr tablet    Other Relevant Orders    TSH, 3rd generation with Free T4 reflex   Other Visit Diagnoses     Annual physical exam    -  Primary    Relevant Orders    CBC and differential    Comprehensive metabolic panel    Lipid panel    Encounter for screening mammogram for breast cancer        Relevant Orders    Mammo screening bilateral w 3d & cad    Encounter for immunization        Encounter for smoking cessation counseling        Relevant Medications    buPROPion (Wellbutrin XL) 300 mg 24 hr tablet    Obesity (BMI 30-39. 9)        Relevant Orders    Ambulatory Referral to Nutrition Services          Immunizations and preventive care screenings were discussed with patient today. Appropriate education was printed on patient's after visit summary. Counseling:  • Alcohol/drug use: discussed moderation in alcohol intake, the recommendations for healthy alcohol use, and avoidance of illicit drug use. • Dental Health: discussed importance of regular tooth brushing, flossing, and dental visits. • Injury prevention: discussed safety/seat belts, safety helmets, smoke detectors, carbon dioxide detectors, and smoking near bedding or upholstery. • Sexual health: discussed sexually transmitted diseases, partner selection, use of condoms, avoidance of unintended pregnancy, and contraceptive alternatives. · Exercise: the importance of regular exercise/physical activity was discussed. Recommend exercise 3-5 times per week for at least 30 minutes. BMI Counseling: Body mass index is 34.15 kg/m². The BMI is above normal. Nutrition recommendations include decreasing portion sizes and encouraging healthy choices of fruits and vegetables. Exercise recommendations include moderate physical activity 150 minutes/week. No pharmacotherapy was ordered. Rationale for BMI follow-up plan is due to patient being overweight or obese. Return in about 3 months (around 10/5/2023).      Chief Complaint:     Chief Complaint   Patient presents with   • Physical Exam     Patient is here for her annual wellness      History of Present Illness:     Adult Annual Physical   Patient here for a comprehensive physical exam. The patient reports Still struggling with anxiety   She stopped taking here meds a couple of months ago   Works at [employer] since quit smoking 12/2022  Still struggling with anxiety   She stopped taking here meds a couple of months ago     Diet and Physical Activity  · Diet/Nutrition: limited fruits/vegetables. · Exercise: no formal exercise. Depression Screening  PHQ-2/9 Depression Screening    Little interest or pleasure in doing things: 3 - nearly every day  Feeling down, depressed, or hopeless: 2 - more than half the days  Trouble falling or staying asleep, or sleeping too much: 3 - nearly every day  Feeling tired or having little energy: 3 - nearly every day  Poor appetite or overeating: 3 - nearly every day  Feeling bad about yourself - or that you are a failure or have let yourself or your family down: 2 - more than half the days  Trouble concentrating on things, such as reading the newspaper or watching television: 2 - more than half the days  Moving or speaking so slowly that other people could have noticed. Or the opposite - being so fidgety or restless that you have been moving around a lot more than usual: 0 - not at all  Thoughts that you would be better off dead, or of hurting yourself in some way: 0 - not at all  PHQ-9 Score: 18   PHQ-9 Interpretation: Moderately severe depression        General Health  · Sleep: sleeps well. · Hearing: normal - bilateral.  · Vision: most recent eye exam <1 year ago. · Dental: regular dental visits and brushes teeth twice daily. /GYN Health  · Patient is: premenopausal  · Last menstrual period: 1 week ago   · Contraceptive method: none. Tubal ligation      Review of Systems:     Review of Systems   Constitutional: Negative. HENT: Negative. Eyes: Negative. Respiratory: Negative. Cardiovascular: Negative. Gastrointestinal: Negative. Endocrine: Negative. Genitourinary: Negative. Musculoskeletal: Negative. Skin: Negative. Allergic/Immunologic: Negative. Neurological: Negative. Hematological: Negative. Psychiatric/Behavioral: Negative. Past Medical History:     Past Medical History:   Diagnosis Date   • Abnormal Pap smear of cervix    • Cervical cancer (HCC)    • HPV (human papilloma virus) infection    • Moderate dysplasia of cervix (SALBADOR II)     Last assessed 2016      Past Surgical History:     Past Surgical History:   Procedure Laterality Date   • CERVICAL BIOPSY  W/ LOOP ELECTRODE EXCISION     • FOOT SURGERY Right    • TONSILLECTOMY AND ADENOIDECTOMY     • TUBAL LIGATION        Social History:     Social History     Socioeconomic History   • Marital status: Single     Spouse name: None   • Number of children: None   • Years of education: None   • Highest education level: None   Occupational History     Employer: [employer]   Tobacco Use   • Smoking status: Former     Packs/day: 0.25     Years: 25.00     Total pack years: 6.25     Types: Cigarettes     Quit date: 2022     Years since quittin.5   • Smokeless tobacco: Never   • Tobacco comments:     1 pk weekly   Vaping Use   • Vaping Use: Never used   Substance and Sexual Activity   • Alcohol use: No   • Drug use: No   • Sexual activity: Yes     Partners: Male     Birth control/protection: Female Sterilization   Other Topics Concern   • None   Social History Narrative    Lack of exercise     Social Determinants of Health     Financial Resource Strain: Low Risk  (2023)    Overall Financial Resource Strain (CARDIA)    • Difficulty of Paying Living Expenses: Not very hard   Food Insecurity: No Food Insecurity (2023)    Hunger Vital Sign    • Worried About Running Out of Food in the Last Year: Never true    • Ran Out of Food in the Last Year: Never true   Transportation Needs: No Transportation Needs (2023)    PRAPARE - Transportation    • Lack of Transportation (Medical): No    • Lack of Transportation (Non-Medical):  No   Physical Activity: Not on file   Stress: Not on file   Social Connections: Not on file   Intimate Partner Violence: Not on file Housing Stability: Not on file      Family History:     Family History   Problem Relation Age of Onset   • No Known Problems Mother    • Diabetes Father    • No Known Problems Sister    • No Known Problems Brother    • No Known Problems Daughter    • No Known Problems Son    • Diabetes Maternal Grandmother    • Ovarian cancer Maternal Grandmother    • Diabetes Paternal Grandmother    • Diabetes Maternal Aunt    • Breast cancer additional onset Paternal Aunt    • Hypertension Family    • Ovarian cysts Family       Current Medications:     Current Outpatient Medications   Medication Sig Dispense Refill   • buPROPion (Wellbutrin XL) 300 mg 24 hr tablet Take 1 tablet (300 mg total) by mouth every morning 90 tablet 0     No current facility-administered medications for this visit. Allergies:     No Known Allergies   Physical Exam:     /60   Pulse 80   Temp 97.9 °F (36.6 °C)   Resp 15   Ht 5' 5.16" (1.655 m)   Wt 93.5 kg (206 lb 3.2 oz)   SpO2 98%   BMI 34.15 kg/m²     Physical Exam  Vitals and nursing note reviewed. Constitutional:       Appearance: Normal appearance. HENT:      Head: Normocephalic and atraumatic. Right Ear: Tympanic membrane normal.      Left Ear: Tympanic membrane normal.      Nose: Nose normal.      Mouth/Throat:      Mouth: Mucous membranes are moist.   Eyes:      Pupils: Pupils are equal, round, and reactive to light. Cardiovascular:      Rate and Rhythm: Normal rate. Pulses: Normal pulses. Heart sounds: Normal heart sounds. Pulmonary:      Effort: Pulmonary effort is normal.      Breath sounds: Normal breath sounds. Musculoskeletal:         General: Normal range of motion. Skin:     General: Skin is warm. Capillary Refill: Capillary refill takes less than 2 seconds. Neurological:      General: No focal deficit present. Mental Status: She is alert and oriented to person, place, and time.    Psychiatric:         Mood and Affect: Mood normal. Behavior: Behavior normal.          Johann Potter MD  [address] [place] [address]

## 2023-07-05 NOTE — ASSESSMENT & PLAN NOTE
PHQ-2/9 Depression Screening    Little interest or pleasure in doing things: 3 - nearly every day  Feeling down, depressed, or hopeless: 2 - more than half the days  Trouble falling or staying asleep, or sleeping too much: 3 - nearly every day  Feeling tired or having little energy: 3 - nearly every day  Poor appetite or overeating: 3 - nearly every day  Feeling bad about yourself - or that you are a failure or have let yourself or your family down: 2 - more than half the days  Trouble concentrating on things, such as reading the newspaper or watching television: 2 - more than half the days  Moving or speaking so slowly that other people could have noticed.  Or the opposite - being so fidgety or restless that you have been moving around a lot more than usual: 0 - not at all  Thoughts that you would be better off dead, or of hurting yourself in some way: 0 - not at all  PHQ-9 Score: 18   PHQ-9 Interpretation: Moderately severe depression        Restarted on wellbutrin

## 2023-08-02 ENCOUNTER — SOCIAL WORK (OUTPATIENT)
Dept: BEHAVIORAL/MENTAL HEALTH CLINIC | Facility: CLINIC | Age: 43
End: 2023-08-02
Payer: COMMERCIAL

## 2023-08-02 ENCOUNTER — TELEPHONE (OUTPATIENT)
Dept: PSYCHIATRY | Facility: CLINIC | Age: 43
End: 2023-08-02

## 2023-08-02 DIAGNOSIS — F43.10 PTSD (POST-TRAUMATIC STRESS DISORDER): Primary | ICD-10-CM

## 2023-08-02 PROCEDURE — 90834 PSYTX W PT 45 MINUTES: CPT | Performed by: SOCIAL WORKER
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
## 2023-08-04 ENCOUNTER — TELEPHONE (OUTPATIENT)
Dept: PSYCHIATRY | Facility: CLINIC | Age: 43
End: 2023-08-04

## 2023-08-10 ENCOUNTER — OFFICE VISIT (OUTPATIENT)
Dept: FAMILY MEDICINE CLINIC | Facility: CLINIC | Age: 43
End: 2023-08-10
Payer: COMMERCIAL

## 2023-08-10 VITALS
DIASTOLIC BLOOD PRESSURE: 80 MMHG | HEIGHT: 65 IN | TEMPERATURE: 98 F | HEART RATE: 60 BPM | BODY MASS INDEX: 34.79 KG/M2 | RESPIRATION RATE: 18 BRPM | WEIGHT: 208.8 LBS | SYSTOLIC BLOOD PRESSURE: 118 MMHG | OXYGEN SATURATION: 99 %

## 2023-08-10 DIAGNOSIS — G56.21 ULNAR NEUROPATHY OF RIGHT UPPER EXTREMITY: Primary | ICD-10-CM

## 2023-08-10 PROCEDURE — 99213 OFFICE O/P EST LOW 20 MIN: CPT | Performed by: NURSE PRACTITIONER

## 2023-08-10 RX ORDER — METHOCARBAMOL 500 MG/1
1000 TABLET, FILM COATED ORAL 4 TIMES DAILY PRN
COMMUNITY
Start: 2023-08-04

## 2023-08-10 RX ORDER — PREDNISONE 20 MG/1
TABLET ORAL
COMMUNITY
Start: 2023-08-04

## 2023-08-10 NOTE — ASSESSMENT & PLAN NOTE
Ulnar distribution of pain and numbness more consistent with cubital tunnel syndrome rather than CTS as diagnosed at urgent care. Pt having difficulty with work tasks, employer not taking a note and there is no light duty. Referred to ortho for management so she can get back to working. She can try a towel around her elbow over night. Ok to keep wearing wrist splint if she feels it is providing comfort.

## 2023-08-10 NOTE — PROGRESS NOTES
Name: Reginaldo Acosta      : 1980      MRN: [id number]  Encounter Provider: MAURICE Chao  Encounter Date: 8/10/2023   Encounter department: [facility]     1. Ulnar neuropathy of right upper extremity  Assessment & Plan:  Ulnar distribution of pain and numbness more consistent with cubital tunnel syndrome rather than CTS as diagnosed at urgent care. Pt having difficulty with work tasks, employer not taking a note and there is no light duty. Referred to ortho for management so she can get back to working. She can try a towel around her elbow over night. Ok to keep wearing wrist splint if she feels it is providing comfort. Orders:  -     Ambulatory Referral to Orthopedic Surgery; Future         Subjective      Pt is a 37 yo female here for urgent care follow up. She was seen at [facility] on  for evaluation of R hand pain and numbness in the 4th and 5th fingers that radiates up her arm to her elbow. She does not have upper arm pain and she gets a pulling pain with shoulder abduction, Her R shoulder bothers her in certain positions. She was evaluated and told that she has carpel tunnel in the right hand. She was advised to wear a brace and they prescribed prednisone and methocarbamol. She is taking the prednisone but not the methocarbamol, she has been wearing the brace most of the day and all night and she does say it is helpful. She has also been taking ibuprofen for pain. Review of Systems   Musculoskeletal: Positive for arthralgias and myalgias. Negative for joint swelling. Skin: Negative for color change. Neurological: Positive for numbness. Negative for weakness.        Current Outpatient Medications on File Prior to Visit   Medication Sig   • buPROPion (Wellbutrin XL) 300 mg 24 hr tablet Take 1 tablet (300 mg total) by mouth every morning   • methocarbamol (ROBAXIN) 500 mg tablet Take 1,000 mg by mouth 4 (four) times a day as needed for muscle spasms   • predniSONE 20 mg tablet PLEASE SEE ATTACHED FOR DETAILED DIRECTIONS       Objective     /80 (BP Location: Left arm, Patient Position: Sitting, Cuff Size: Large)   Pulse 60   Temp 98 °F (36.7 °C) (Tympanic)   Resp 18   Ht 5' 5.16" (1.655 m)   Wt 94.7 kg (208 lb 12.8 oz)   SpO2 99%   BMI 34.58 kg/m²     Physical Exam  Vitals reviewed. Constitutional:       General: She is awake. She is not in acute distress. Appearance: Normal appearance. She is well-developed and well-groomed. She is not ill-appearing or diaphoretic. Eyes:      General: Lids are normal.      Conjunctiva/sclera: Conjunctivae normal.   Cardiovascular:      Rate and Rhythm: Normal rate. Pulses: Normal pulses. Pulmonary:      Effort: Pulmonary effort is normal.   Musculoskeletal:      Right forearm: Normal.      Right wrist: No swelling or tenderness. Normal range of motion. Normal pulse. Right hand: No swelling. Decreased range of motion (due to pain). Normal strength. Decreased sensation of the ulnar distribution. Normal capillary refill. Normal pulse. Skin:     General: Skin is dry. Neurological:      Mental Status: She is alert and oriented to person, place, and time. Sensory: Sensory deficit (R 4th, 5th fingers) present. Motor: No weakness (pain limits  strength). Psychiatric:         Attention and Perception: Attention normal.         Mood and Affect: Mood normal.         Speech: Speech normal.         Behavior: Behavior is cooperative. Thought Content:  Thought content normal.         Judgment: Judgment normal.       MAURICE Carty

## 2023-08-12 ENCOUNTER — HOSPITAL ENCOUNTER (OUTPATIENT)
Dept: MAMMOGRAPHY | Facility: HOSPITAL | Age: 43
Discharge: HOME | End: 2023-08-12
Payer: COMMERCIAL

## 2023-08-12 VITALS — HEIGHT: 65 IN | BODY MASS INDEX: 34.66 KG/M2 | WEIGHT: 208 LBS

## 2023-08-12 PROCEDURE — 77067 SCR MAMMO BI INCL CAD: CPT

## 2023-08-12 PROCEDURE — 77063 BREAST TOMOSYNTHESIS BI: CPT

## 2023-08-19 ENCOUNTER — OFFICE VISIT (OUTPATIENT)
Dept: OBGYN CLINIC | Facility: CLINIC | Age: 43
End: 2023-08-19
Payer: COMMERCIAL

## 2023-08-19 VITALS
WEIGHT: 208 LBS | DIASTOLIC BLOOD PRESSURE: 82 MMHG | HEART RATE: 84 BPM | BODY MASS INDEX: 34.66 KG/M2 | SYSTOLIC BLOOD PRESSURE: 129 MMHG | HEIGHT: 65 IN

## 2023-08-19 DIAGNOSIS — G56.21 IRRITATION OF RIGHT ULNAR NERVE: ICD-10-CM

## 2023-08-19 DIAGNOSIS — R20.0 NUMBNESS AND TINGLING IN RIGHT HAND: Primary | ICD-10-CM

## 2023-08-19 DIAGNOSIS — R20.2 NUMBNESS AND TINGLING IN RIGHT HAND: Primary | ICD-10-CM

## 2023-08-19 DIAGNOSIS — G56.21 ULNAR NEUROPATHY OF RIGHT UPPER EXTREMITY: ICD-10-CM

## 2023-08-19 PROCEDURE — 99203 OFFICE O/P NEW LOW 30 MIN: CPT | Performed by: PHYSICIAN ASSISTANT

## 2023-08-19 NOTE — PATIENT INSTRUCTIONS
Cubital Tunnel Syndrome   WHAT YOU NEED TO KNOW:   What is cubital tunnel syndrome? Cubital tunnel syndrome is a condition that causes pressure to build on the ulnar nerve in your elbow. The ulnar nerve controls muscles and feeling in the hand. What increases my risk for cubital tunnel syndrome? A past injury to your elbow or ulnar nerve, or a tumor or cyst    Leaning on or bending your elbow for long periods of time, including during sleep    Repetitive motion of your elbow, such as painting, playing a musical instrument, or using power tools    Sports that stretch or put pressure on the elbow, such as tennis, riding a bicycle, or lifting weights    Health conditions such as hypothyroidism, diabetes, obesity, or arthritis    What are the signs and symptoms of cubital tunnel syndrome? Numbness and tingling in your arm or hand, usually in the ring and little fingers    Pain in your elbow that extends into your forearm and hand    Weakness in your hand and fingers    Not being able to straighten your fingers, usually the ring and little fingers    How is cubital tunnel syndrome diagnosed? Your healthcare provider will ask about your signs and symptoms and examine your hand and arm. He or she may check the movement of your shoulder, elbow, wrist, and fingers. You may be asked to cross your middle and ring fingers. You may need any of the following:  Nerve tests  may be used to test how well your nerves are working. The tests may also be used to check for numbness, tingling, and pain. Your healthcare provider will tap or press on your elbow. He or she may also ask you to keep your elbow bent for a short time. X-ray, ultrasound, or MRI  pictures of your elbow may be used to find the cause of your symptoms. You may be given contrast liquid to help any problems show up better in the pictures. Tell healthcare providers if you have ever had an allergic reaction to contrast liquid.  Do not enter the MRI room with anything metal. Metal can cause serious injury. Tell healthcare providers if you have any metal in or on your body. How is cubital tunnel syndrome treated? Cubital tunnel syndrome may go away without treatment. If it does not go away, you may need any of the following:  NSAIDs  help decrease swelling and pain or fever. This medicine is available with or without a doctor's order. NSAIDs can cause stomach bleeding or kidney problems in certain people. If you take blood thinner medicine, always ask your healthcare provider if NSAIDs are safe for you. Always read the medicine label and follow directions. A steroid injection  helps decrease pain and swelling. Surgery  may be needed if your symptoms do not get better within 3 months. Surgery will take pressure off your ulnar nerve. Your surgeon may move your nerve to a different area to stop it from being stretched or pinched. He or she may remove part of your bone if it is pressing on your nerve. How can I manage my symptoms? Do not put pressure on your elbow. Certain positions put pressure on the ulnar nerve in your elbow. Do not lean on your elbow. Do not sleep with your arm overhead and elbow bent. Go to sleep with your arm straight and by your side. Apply ice. Ice helps decrease swelling and pain and prevents tissue damage. Use an ice pack or put crushed ice in a plastic bag. Cover the bag with a towel before you place it on your skin. Apply ice for 15 to 20 minutes every hour, or as directed. Rest your arm. Avoid activities that cause your symptoms to allow your nerve to heal.    Go to physical therapy as directed. A physical therapist can show you exercises to help improve movement and strength. Physical therapy can also help decrease pain and loss of function. Use an elbow splint or brace. The brace or splint helps decrease arm movement and keep pressure off your ulnar nerve.  Your healthcare provider will tell you when and how long to wear it each day. You may need to wear it at night. You may also need elbow pads to protect your elbow. When should I seek immediate care? You suddenly lose feeling in your hand or fingers. You cannot move your ring or little finger. When should I call my doctor? Your symptoms get worse. Your hand and fingers are so weak that you cannot grab, squeeze, or lift items. You have questions or concerns about your condition or care. CARE AGREEMENT:   You have the right to help plan your care. Learn about your health condition and how it may be treated. Discuss treatment options with your healthcare providers to decide what care you want to receive. You always have the right to refuse treatment. The above information is an  only. It is not intended as medical advice for individual conditions or treatments. Talk to your doctor, nurse or pharmacist before following any medical regimen to see if it is safe and effective for you. © Copyright Juan [employer] Mac[employer] 2022 Information is for End User's use only and may not be sold, redistributed or otherwise used for commercial purposes.

## 2023-08-19 NOTE — PROGRESS NOTES
Orthopaedic Surgery - Office Note  Nabila Flores (67 y.o. female)   : 1980   MRN: [id number]  Encounter Date: 2023    Chief Complaint   Patient presents with   • Right Hand - Pain, Numbness         Assessment/Plan  Diagnoses and all orders for this visit:    Numbness and tingling in right hand    Irritation of right ulnar nerve    The diagnosis as well as treatment options were reviewed with the patient in the office today. I advised her I believe she has irritation of the ulnar nerve most likely at the elbow. I would recommend an ultrasound of this area to confirm the diagnosis and evaluate for impingement versus subluxation of the ulnar nerve. In addition she will be referred to occupational therapy for evaluation and treatment. She may continue the brace for symptomatic relief although it is not required at this time. She has already had a course of oral steroids and oral anti-inflammatories. We reviewed the differential would include a cervical neck nerve pathology but it is felt less likely at this time     Return for Recheck with hand surgeon to discuss ultrasound results. History of Present Illness  This is a new patient with right hand numbness and tingling in the ring and small finger. [facility] and treatment and was told she had carpal tunnel syndrome. She was started on prednisone and a muscle relaxer. She took the prednisone without much relief. She was also given a brace which she was wearing most of the day and at night without much relief. Patient then followed up with PCP on 8/10/2023 who noted her symptoms were more consistent with cubital tunnel syndrome. Patient was recommended nighttime elbow wrapping and referred for evaluation and treatment.   Patient reports currently she is having burning pain with numbness and tingling in the ring and small finger which shoots up to her elbow and occasionally up the arm into the shoulder. She denies any injury to this area. She has been utilizing a brace which helps after 4 to 5 hours at work and she becomes fatigued. She has not been getting good night sleep due to the numbness and tingling sensation. She denies any symptoms in her thumb index and middle finger. Review of Systems  Pertinent items are noted in HPI. All other systems were reviewed and are negative. Physical Exam  LMP 07/25/2023 (Exact Date)   Cons: Appears well. No apparent distress. Psych: Alert. Oriented x3. Mood and affect normal.  Patient's right elbow has a markedly positive Tinel's at the cubital tunnel causing pain at the palpation site as well as shooting symptoms up and down the arm into the ring and small finger. She has a negative Tinel's at the carpal tunnel and a negative Phalen's. She has full active and passive range of motion of her right hand wrist and elbow. No gross weakness is appreciated. She has no loss of motion. Distal radial ulnar pulses are +2             Studies Reviewed  X-rays not indicated at this time. Procedures  No procedures today. Medical, Surgical, Family, and Social History  The patient's medical history, family history, and social history, were reviewed and updated as appropriate.     Past Medical History:   Diagnosis Date   • Abnormal Pap smear of cervix    • Cervical cancer (HCC)    • HPV (human papilloma virus) infection    • Moderate dysplasia of cervix ([facility])     Last assessed 8/18/2016       Past Surgical History:   Procedure Laterality Date   • CERVICAL BIOPSY  W/ LOOP ELECTRODE EXCISION     • FOOT SURGERY Right    • TONSILLECTOMY AND ADENOIDECTOMY     • TUBAL LIGATION         Family History   Problem Relation Age of Onset   • No Known Problems Mother    • Diabetes Father    • No Known Problems Sister    • No Known Problems Daughter    • No Known Problems Daughter    • Diabetes Maternal Grandmother    • Ovarian cancer Maternal Grandmother    • Diabetes Paternal Grandmother    • No Known Problems Brother    • No Known Problems Son    • Diabetes Maternal Aunt    • No Known Problems Maternal Aunt    • No Known Problems Maternal Aunt    • No Known Problems Maternal Aunt    • No Known Problems Maternal Aunt    • No Known Problems Maternal Aunt    • No Known Problems Maternal Aunt    • Breast cancer additional onset Paternal Aunt        Social History     Occupational History     Employer: [employer]   Tobacco Use   • Smoking status: Former     Packs/day: 0.25     Years: 25.00     Total pack years: 6.25     Types: Cigarettes     Quit date: 2022     Years since quittin.6     Passive exposure: Never   • Smokeless tobacco: Never   • Tobacco comments:     1 pk weekly   Vaping Use   • Vaping Use: Never used   Substance and Sexual Activity   • Alcohol use: No   • Drug use: No   • Sexual activity: Yes     Partners: Male     Birth control/protection: Female Sterilization       No Known Allergies      Current Outpatient Medications:   •  buPROPion (Wellbutrin XL) 300 mg 24 hr tablet, Take 1 tablet (300 mg total) by mouth every morning, Disp: 90 tablet, Rfl: 0  •  methocarbamol (ROBAXIN) 500 mg tablet, Take 1,000 mg by mouth 4 (four) times a day as needed for muscle spasms, Disp: , Rfl:   •  predniSONE 20 mg tablet, PLEASE SEE ATTACHED FOR DETAILED DIRECTIONS, Disp: , Rfl:       Reagan Price PA-C

## 2023-08-21 ENCOUNTER — TELEPHONE (OUTPATIENT)
Dept: OBGYN CLINIC | Facility: CLINIC | Age: 43
End: 2023-08-21

## 2023-08-30 ENCOUNTER — EVALUATION (OUTPATIENT)
Dept: OCCUPATIONAL THERAPY | Facility: CLINIC | Age: 43
End: 2023-08-30
Payer: COMMERCIAL

## 2023-08-30 DIAGNOSIS — R20.0 NUMBNESS AND TINGLING IN RIGHT HAND: ICD-10-CM

## 2023-08-30 DIAGNOSIS — R20.2 NUMBNESS AND TINGLING OF RIGHT HAND: ICD-10-CM

## 2023-08-30 DIAGNOSIS — R20.2 NUMBNESS AND TINGLING IN RIGHT HAND: ICD-10-CM

## 2023-08-30 DIAGNOSIS — R20.0 NUMBNESS AND TINGLING OF RIGHT HAND: ICD-10-CM

## 2023-08-30 DIAGNOSIS — G56.21 IRRITATION OF RIGHT ULNAR NERVE: ICD-10-CM

## 2023-08-30 DIAGNOSIS — G56.21 ULNAR NEUROPATHY OF RIGHT UPPER EXTREMITY: Primary | ICD-10-CM

## 2023-08-30 PROCEDURE — 97166 OT EVAL MOD COMPLEX 45 MIN: CPT

## 2023-08-30 PROCEDURE — 97110 THERAPEUTIC EXERCISES: CPT

## 2023-08-30 NOTE — PROGRESS NOTES
OT Evaluation     Today's date: 2023  Patient name: Rory Natarajan  : 1980  MRN: [id number]  Referring provider: No ref. provider found  Dx:   Encounter Diagnosis     ICD-10-CM    1. Ulnar neuropathy of right upper extremity  G56.21       2. Numbness and tingling of right hand  R20.0     R20.2           Start Time: 1035  Stop Time: 1115  Total time in clinic (min): 40 minutes    Assessment  Assessment details: Rory Natarajan is a 41 y.o. female who presents with Ulnar neuropathy of right upper extremity, Numbness and tingling of right hand. Patient tolerated session fair. Apurva Pair reported difficulty with activities of daily living secondary to decreased range of motion, pain with function. Provided home exercise program for range of motion and ulnar nerve glides. Patient was able to demonstrate home program past instruction with use of handouts. Patient advised to contact doctor if there is a change of status. Patient advised to discontinue any exercise which cause increased pain. Patient is a good candidate to benefit from skilled occupational therapy to address impairments and return to maximal level of function with minimal symptoms.     Impairments: abnormal coordination, abnormal or restricted ROM, activity intolerance, impaired physical strength, lacks appropriate home exercise program, pain with function and weight-bearing intolerance  Understanding of Dx/Px/POC: good   Prognosis: good    Goals  Short term goals:  Patient to demonstrate understanding of home exercise program in 2 weeks for decreased pain with activities of daily living  Patient to demonstrate increased active range of motion of elbow to 140/-10 degrees in 4 weeks to aid in showering  Patient to demonstrate increased  strength to 20 lbs in 4 weeks to increase  on full cup  Patient to demonstrate decreased edema by 1.5 cm in 4 weeks to increase range of motion for dressing  Patient to report a decrease in pain by at least 1 point on a 0-10 scale in 2 weeks to aid in dressing    Long term goals:  Patient to demonstrate functional active range of motion for independent ADL's by time of discharge  Patient to demonstrate functional strength for independent ADL's by time of discharge  Patient to demonstrate understanding of final discharge home exercise program by time of discharge    Plan  Patient would benefit from: OT eval, skilled occupational therapy and custom splinting  Planned modality interventions: ultrasound, thermotherapy: hydrocollator packs, manual electrical stimulation and cryotherapy  Planned therapy interventions: joint mobilization, manual therapy, massage, strengthening, stretching, therapeutic activities, therapeutic exercise, fine motor coordination training, flexibility, functional ROM exercises, home exercise program, activity modification, neuromuscular re-education, patient education, graded activity and graded exercise  Frequency: 2x week  Duration in weeks: 12  Plan of Care beginning date: 8/30/2023  Plan of Care expiration date: 11/22/2023  Treatment plan discussed with: patient        Subjective Evaluation    History of Present Illness  Mechanism of injury: Patient reported that she has had symptoms for about the last month. She reported that she has always had issues with her right small and ring fingers locking, and that about a month ago she began having the numbness and pain running from her 4th and 5th digits, through her elbow, and occasionally into her neck. Patient reported that she got a brace for her wrist, but that she has not been utilizing it secondary to it not helping with the symptoms. Patient reported that she has an ultrasound scheduled for 10/11/23 and an appointment with the hand surgeon on 10/9/23. "Since Thursday it's been in my neck and just all the way down." "I try to open a can of soda and sometimes it just locks up.  Flipping food,at work it's just been everything." Patient reported difficulty toileting. "If I try to hold onto something it just drops. Things just fall out." She reported difficulty with cleaning. Patient Goals  Patient goals for therapy: increased strength, return to work, independence with ADLs/IADLs and decreased pain    Pain  At best pain ratin  At worst pain ratin  Location: right upper extremity/neck  Quality: throbbing, discomfort, dull ache, sharp and tight  Exacerbated by: sleeping.   Progression: worsening    Social Support    Employment status: working ([employer]- on light duty   )  Hand dominance: right          Objective     Neurological Testing     Sensation     Elbow     Right Elbow   Hyposensation: light touch    Additional Neurological Details  [place] monofilaments:  2.83- 2nd-4th digits  3.61- 1st and 5th digits    Active Range of Motion     Left Elbow   Elbow hyperextension  Flexion: 152 degrees   Extension: 5 degrees     Right Elbow   Flexion: 118 degrees   Extension: -35 degrees   Forearm pronation: with pain    Left Wrist   Wrist flexion: 77 degrees   Wrist extension: 68 degrees   Radial deviation: 20 degrees   Ulnar deviation: 40 degrees     Right Wrist   Wrist flexion: 22 degrees   Wrist extension: 58 degrees   Radial deviation: 10 degrees   Ulnar deviation: 20 degrees     Left Thumb     Opposition: Able to touch distal palmar crease    Right Thumb   Opposition: 2cm from distal palmar crease    Additional Active Range of Motion Details  Forearm range of motion WNL, however patient reported pain with pronation    Strength/Myotome Testing     Left Elbow   Flexion: 5  Extension: 5    Right Elbow   Flexion: 4  Extension: 4    Left Wrist/Hand      (2nd hand position)     Trial 1: 55.9    Thumb Strength  Key/Lateral Pinch     Trial 1: 15    Right Wrist/Hand      (2nd hand position)     Trial 1: 3.9    Thumb Strength   Key/Lateral Pinch     Trial 1: 5    Additional Strength Details  Patient reported pain with strength assessment    Tests Right Elbow   Positive elbow flexion and Tinel's sign (cubital tunnel).      Swelling   Left Elbow Girth Measurements   Joint line: 25 cm    Right Elbow Girth Measurements   Joint line: 27 cm             Precautions:     Manuals 8/30                                                                Neuro Re-Ed             HAYLIE #1-3 x10                                                                                          Ther Ex             Tendon glides x10            AROM wrist/ forearm/ elbow x10            Thumb opp x10            Table slides                                                                 Ther Activity             Juxtaciser                                                                 Modalities             Moist heat

## 2023-09-05 ENCOUNTER — OFFICE VISIT (OUTPATIENT)
Dept: OCCUPATIONAL THERAPY | Facility: CLINIC | Age: 43
End: 2023-09-05
Payer: COMMERCIAL

## 2023-09-05 DIAGNOSIS — G56.21 ULNAR NEUROPATHY OF RIGHT UPPER EXTREMITY: Primary | ICD-10-CM

## 2023-09-05 DIAGNOSIS — R20.0 NUMBNESS AND TINGLING OF RIGHT HAND: ICD-10-CM

## 2023-09-05 DIAGNOSIS — G56.21 IRRITATION OF RIGHT ULNAR NERVE: ICD-10-CM

## 2023-09-05 DIAGNOSIS — R20.2 NUMBNESS AND TINGLING OF RIGHT HAND: ICD-10-CM

## 2023-09-05 PROCEDURE — 97110 THERAPEUTIC EXERCISES: CPT

## 2023-09-05 PROCEDURE — 97140 MANUAL THERAPY 1/> REGIONS: CPT

## 2023-09-05 PROCEDURE — 97530 THERAPEUTIC ACTIVITIES: CPT

## 2023-09-05 NOTE — PROGRESS NOTES
Daily Note     Today's date: 2023  Patient name: Merced Bello  : 1980  MRN: [id number]  Referring provider: FERNY Saunders*  Dx:   Encounter Diagnosis     ICD-10-CM    1. Ulnar neuropathy of right upper extremity  G56.21       2. Numbness and tingling of right hand  R20.0     R20.2       3. Irritation of right ulnar nerve  G56.21           Start Time: 0810  Stop Time: 08  Total time in clinic (min): 48 minutes    Subjective: Patient reported neck stiffness. "It's more towards the middle of the day where it just feels really tight."      Objective: See treatment diary below      Assessment: Tolerated treatment fair. Patient demonstrated fatigue post treatment and would benefit from continued OT. Patient reported being compliant with home exercises. Patient required utilizing left hand to assist stabilizing right arm during fine motor tasks secondary to reported tightness and fatigue. She reported significant discomfort/difficulty with exercises/activities involving elbow or shoulder movement. Plan: Continue per plan of care. Progress treatment as tolerated.        Precautions:     Manuals            STM  5'           Cupping  3'           KT taping  2'                        Neuro Re-Ed             HAYLIE #1-3 x10 x10                                                                                         Ther Ex             Tendon glides x10            AROM wrist/ forearm/ elbow x10 x10; forearm sup/pro- cones x6           Thumb opp x10 x10           Table slides             Pullies  3'           Digit tabletop ext  x10           Digit abd/add  Marbles                        Ther Activity             Juxtaciser  x5           Small pegs  Transl x20           Wall walks                                       Modalities             Moist heat  5'

## 2023-09-07 ENCOUNTER — OFFICE VISIT (OUTPATIENT)
Dept: OCCUPATIONAL THERAPY | Facility: CLINIC | Age: 43
End: 2023-09-07
Payer: COMMERCIAL

## 2023-09-07 DIAGNOSIS — R20.2 NUMBNESS AND TINGLING OF RIGHT HAND: ICD-10-CM

## 2023-09-07 DIAGNOSIS — R20.0 NUMBNESS AND TINGLING OF RIGHT HAND: ICD-10-CM

## 2023-09-07 DIAGNOSIS — G56.21 ULNAR NEUROPATHY OF RIGHT UPPER EXTREMITY: Primary | ICD-10-CM

## 2023-09-07 PROCEDURE — 97110 THERAPEUTIC EXERCISES: CPT

## 2023-09-07 PROCEDURE — 97530 THERAPEUTIC ACTIVITIES: CPT

## 2023-09-07 PROCEDURE — 97140 MANUAL THERAPY 1/> REGIONS: CPT

## 2023-09-07 NOTE — PROGRESS NOTES
Daily Note     Today's date: 2023  Patient name: Della Cotton  : 1980  MRN: [id number]  Referring provider: FERNY Woods*  Dx:   Encounter Diagnosis     ICD-10-CM    1. Ulnar neuropathy of right upper extremity  G56.21       2. Numbness and tingling of right hand  R20.0     R20.2           Start Time: 1105  Stop Time: 1155  Total time in clinic (min): 50 minutes    Subjective: Patient reported significant relief after cupping in therapy last session. Objective: See treatment diary below      Assessment: Tolerated treatment fair. Patient demonstrated fatigue post treatment and would benefit from continued OT. Patient reported being compliant with home exercises. She reported significant improvements after last session. Patient tolerated increased repetitions since last session. New activity performed this date with good tolerance. KT tape applied at end of session. Patient was instructed to remove if irritation occurs or after three days. Plan: Continue per plan of care. Progress treatment as tolerated.        Precautions:     Manuals           STM  5' IASTM 5'          Cupping  3' 3'          KT taping  2' 2'                       Neuro Re-Ed             HAYLIE #1-3 x10 x10                                                                                         Ther Ex             Tendon glides x10            AROM wrist/ forearm/ elbow x10 x10; forearm sup/pro- cones x6 x10; cones sup/pro x6          Thumb opp x10 x10           Pullies  3' 3'          Digit tabletop ext  x10 x10          Digit abd/add  Marbles Marbles                       Ther Activity             Juxtaciser  x5 x5          Small pegs  Transl x20 Transl x20          Wall walks   TB 3x up/down, 1x sides                                    Modalities             Moist heat  5' 5'

## 2023-09-13 ENCOUNTER — OFFICE VISIT (OUTPATIENT)
Dept: OCCUPATIONAL THERAPY | Facility: CLINIC | Age: 43
End: 2023-09-13
Payer: COMMERCIAL

## 2023-09-13 DIAGNOSIS — G56.21 IRRITATION OF RIGHT ULNAR NERVE: ICD-10-CM

## 2023-09-13 DIAGNOSIS — R20.0 NUMBNESS AND TINGLING OF RIGHT HAND: ICD-10-CM

## 2023-09-13 DIAGNOSIS — R20.0 NUMBNESS AND TINGLING IN RIGHT HAND: ICD-10-CM

## 2023-09-13 DIAGNOSIS — G56.21 ULNAR NEUROPATHY OF RIGHT UPPER EXTREMITY: Primary | ICD-10-CM

## 2023-09-13 DIAGNOSIS — R20.2 NUMBNESS AND TINGLING IN RIGHT HAND: ICD-10-CM

## 2023-09-13 DIAGNOSIS — R20.2 NUMBNESS AND TINGLING OF RIGHT HAND: ICD-10-CM

## 2023-09-13 PROCEDURE — 97530 THERAPEUTIC ACTIVITIES: CPT | Performed by: OCCUPATIONAL THERAPIST

## 2023-09-13 PROCEDURE — 97140 MANUAL THERAPY 1/> REGIONS: CPT | Performed by: OCCUPATIONAL THERAPIST

## 2023-09-13 NOTE — PROGRESS NOTES
Daily Note     Today's date: 2023  Patient name: Cj Rocha  : 1980  MRN: [id number]  Referring provider: FERNY Hurtado*  Dx:   Encounter Diagnosis     ICD-10-CM    1. Ulnar neuropathy of right upper extremity  G56.21       2. Numbness and tingling of right hand  R20.0     R20.2       3. Irritation of right ulnar nerve  G56.21       4. Numbness and tingling in right hand  R20.0     R20.2                      Subjective: Less pain reported in the elbow and the arm, but continues with reports of symptoms in the hand. Objective: See treatment diary below. Assessment: Discomfort with end range nerve gliding and STM in the FCU. Plan: Continue per plan of care. Progress treatment as tolerated.        Precautions:     Manuals          STM  5' IASTM 5' IASTM 5'         Cupping  3' 3' 3'         KT taping  2' 2' X                      Neuro Re-Ed             HAYLIE #1-3 x10 x10  5'                                                                                       Ther Ex             Tendon glides x10            AROM wrist/ forearm/ elbow x10 x10; forearm sup/pro- cones x6 x10; cones sup/pro x6          Thumb opp x10 x10           Pullies  3' 3' 3'          Digit tabletop ext  x10 x10          Digit abd/add  Marbles Marbles                       Ther Activity             Juxtaciser  x5 x5 x5         Small pegs  Transl x20 Transl x20 x1         Wall walks   TB 3x up/down, 1x sides TB x3                                   Modalities             Moist heat  5' 5' 5' elbow

## 2023-10-02 ENCOUNTER — OFFICE VISIT (OUTPATIENT)
Dept: OCCUPATIONAL THERAPY | Facility: CLINIC | Age: 43
End: 2023-10-02
Payer: COMMERCIAL

## 2023-10-02 DIAGNOSIS — G56.21 ULNAR NEUROPATHY OF RIGHT UPPER EXTREMITY: ICD-10-CM

## 2023-10-02 DIAGNOSIS — G56.21 IRRITATION OF RIGHT ULNAR NERVE: Primary | ICD-10-CM

## 2023-10-02 DIAGNOSIS — R20.0 NUMBNESS AND TINGLING OF RIGHT HAND: ICD-10-CM

## 2023-10-02 DIAGNOSIS — R20.2 NUMBNESS AND TINGLING OF RIGHT HAND: ICD-10-CM

## 2023-10-02 PROCEDURE — 97110 THERAPEUTIC EXERCISES: CPT

## 2023-10-02 PROCEDURE — 97530 THERAPEUTIC ACTIVITIES: CPT

## 2023-10-02 PROCEDURE — 97140 MANUAL THERAPY 1/> REGIONS: CPT

## 2023-10-02 NOTE — PROGRESS NOTES
Daily Note     Today's date: 10/2/2023  Patient name: Kaylen Monsalve  : 1980  MRN: [id number]  Referring provider: FERNY Pabon*  Dx:   Encounter Diagnosis     ICD-10-CM    1. Ulnar neuropathy of right upper extremity  G56.21       2. Numbness and tingling of right hand  R20.0     R20.2       3. Irritation of right ulnar nerve  G56.21                      Subjective: It's hard to lift heavy things, and when I use the hand the last 2 fingers lock up. Objective: See treatment diary below. Assessment: Forearm flexor tightness, and pain/discomfort with IASTM to this area. Better tolerance to HAYLIE. Weakness in RF and SF with finger add w/marbles. / way through she had difficulty hold the marbles between the last 2 fingers due to fatigue. Plan: Continue per plan of care. Progress treatment as tolerated.        10/2Precautions:     Manuals 8/30 9/5 9/7 9/13 10/2        STM  5' IASTM 5' IASTM 5' IASTM 8'        Cupping  3' 3' 3' 3'x2        KT taping  2' 2' X Pt deferred                     Neuro Re-Ed             HAYLIE #1-3 x10 x10  5' 5'                                                                                      Ther Ex             Tendon glides x10            AROM wrist/ forearm/ elbow x10 x10; forearm sup/pro- cones x6 x10; cones sup/pro x6          Thumb opp x10 x10           Pullies  3' 3' 3'  3'        Digit tabletop ext  x10 x10          Digit abd/add  [employer] x21                     Ther Activity             Juxtaciser  x5 x5 x5 5x        Small pegs  Transl x20 Transl x20 x1 Key pegs translation        Wall walks   TB 3x up/down, 1x sides TB x3 TB 3x each                                  Modalities             Moist heat  5' 5' 5' elbow 5' elbow

## 2023-10-04 ENCOUNTER — OFFICE VISIT (OUTPATIENT)
Dept: FAMILY MEDICINE CLINIC | Facility: CLINIC | Age: 43
End: 2023-10-04
Payer: COMMERCIAL

## 2023-10-04 VITALS
WEIGHT: 216.8 LBS | HEIGHT: 65 IN | TEMPERATURE: 98.1 F | OXYGEN SATURATION: 98 % | BODY MASS INDEX: 36.12 KG/M2 | RESPIRATION RATE: 19 BRPM | HEART RATE: 81 BPM | DIASTOLIC BLOOD PRESSURE: 70 MMHG | SYSTOLIC BLOOD PRESSURE: 110 MMHG

## 2023-10-04 DIAGNOSIS — F33.42 RECURRENT MAJOR DEPRESSIVE DISORDER, IN FULL REMISSION (HCC): ICD-10-CM

## 2023-10-04 DIAGNOSIS — J06.9 ACUTE URI: Primary | ICD-10-CM

## 2023-10-04 DIAGNOSIS — J34.89 STUFFY AND RUNNY NOSE: ICD-10-CM

## 2023-10-04 DIAGNOSIS — G56.21 ULNAR NEUROPATHY OF RIGHT UPPER EXTREMITY: ICD-10-CM

## 2023-10-04 PROCEDURE — 99214 OFFICE O/P EST MOD 30 MIN: CPT | Performed by: FAMILY MEDICINE

## 2023-10-04 PROCEDURE — 87635 SARS-COV-2 COVID-19 AMP PRB: CPT | Performed by: FAMILY MEDICINE

## 2023-10-04 RX ORDER — FLUTICASONE PROPIONATE 50 MCG
2 SPRAY, SUSPENSION (ML) NASAL DAILY
Qty: 16 G | Refills: 0 | Status: SHIPPED | OUTPATIENT
Start: 2023-10-04

## 2023-10-04 NOTE — PROGRESS NOTES
Name: Arnav Poster      : 1980      MRN: [id number]  Encounter Provider: Mick Yang MD  Encounter Date: 10/4/2023   Encounter department: [facility]     1. Acute URI  Comments:  supportive care  flonase as directed  Orders:  -     fluticasone (FLONASE) 50 mcg/act nasal spray; 2 sprays into each nostril daily    2. Stuffy and runny nose  -     COVID Only- Office Collect    3. Recurrent major depressive disorder, in full remission [facility])  Assessment & Plan:  Not taking her wellbutrin  She will restart it again       4. Ulnar neuropathy of right upper extremity  Assessment & Plan:  Currently in PT  Steroids didn't help  She has an appt this month with hand surgeon             Subjective       Nasal Congestion (Patient being seen for Nasal Congestion-stuffy and runny nose )  Shortness of Breath (Patient being seen for SOB when coughing)  Follow-up (Patient being seen for 3 month follow up-Wellbutrin have not taking it one month)    URI   This is a new problem. The current episode started today. The problem has been waxing and waning. There has been no fever. Associated symptoms include congestion, coughing and rhinorrhea. Pertinent negatives include no abdominal pain, chest pain, diarrhea, dysuria, ear pain, headaches, joint pain, joint swelling, nausea, neck pain, plugged ear sensation, rash, sinus pain, sneezing, sore throat, swollen glands, vomiting or wheezing. She has tried increased fluids for the symptoms. The treatment provided mild relief. Review of Systems   HENT: Positive for congestion and rhinorrhea. Negative for ear pain, sinus pain, sneezing and sore throat. Respiratory: Positive for cough. Negative for wheezing. Cardiovascular: Negative for chest pain. Gastrointestinal: Negative for abdominal pain, diarrhea, nausea and vomiting. Genitourinary: Negative for dysuria. Musculoskeletal: Negative for joint pain and neck pain.    Skin: Negative for rash. Neurological: Negative for headaches. Current Outpatient Medications on File Prior to Visit   Medication Sig   • buPROPion (Wellbutrin XL) 300 mg 24 hr tablet Take 1 tablet (300 mg total) by mouth every morning   • methocarbamol (ROBAXIN) 500 mg tablet Take 1,000 mg by mouth 4 (four) times a day as needed for muscle spasms (Patient not taking: Reported on 8/19/2023)   • [DISCONTINUED] predniSONE 20 mg tablet PLEASE SEE ATTACHED FOR DETAILED DIRECTIONS (Patient not taking: Reported on 8/19/2023)       Objective     /70 (BP Location: Left arm, Patient Position: Sitting, Cuff Size: Standard)   Pulse 81   Temp 98.1 °F (36.7 °C) (Tympanic)   Resp 19   Ht 5' 5" (1.651 m)   Wt 98.3 kg (216 lb 12.8 oz)   SpO2 98%   BMI 36.08 kg/m²     Physical Exam  Constitutional:       Appearance: She is well-developed. HENT:      Head: Normocephalic and atraumatic. Mouth/Throat:      Mouth: Mucous membranes are moist.      Pharynx: No pharyngeal swelling or oropharyngeal exudate. Eyes:      Extraocular Movements: Extraocular movements intact. Pupils: Pupils are equal, round, and reactive to light. Cardiovascular:      Rate and Rhythm: Normal rate and regular rhythm. Pulmonary:      Effort: Pulmonary effort is normal.      Breath sounds: Normal breath sounds. No decreased breath sounds or wheezing. Neurological:      Mental Status: She is alert.        Mick Yang MD

## 2023-10-05 LAB — SARS-COV-2 RNA RESP QL NAA+PROBE: NEGATIVE

## 2023-10-08 DIAGNOSIS — Z71.6 ENCOUNTER FOR SMOKING CESSATION COUNSELING: ICD-10-CM

## 2023-10-09 ENCOUNTER — HOSPITAL ENCOUNTER (OUTPATIENT)
Dept: RADIOLOGY | Facility: HOSPITAL | Age: 43
Discharge: HOME | End: 2023-10-09
Payer: COMMERCIAL

## 2023-10-09 DIAGNOSIS — G56.21 IRRITATION OF RIGHT ULNAR NERVE: ICD-10-CM

## 2023-10-09 DIAGNOSIS — R20.0 NUMBNESS AND TINGLING IN RIGHT HAND: ICD-10-CM

## 2023-10-09 DIAGNOSIS — G56.21 ULNAR NEUROPATHY OF RIGHT UPPER EXTREMITY: ICD-10-CM

## 2023-10-09 DIAGNOSIS — R20.2 NUMBNESS AND TINGLING IN RIGHT HAND: ICD-10-CM

## 2023-10-09 PROCEDURE — 76882 US LMTD JT/FCL EVL NVASC XTR: CPT

## 2023-10-09 RX ORDER — BUPROPION HYDROCHLORIDE 300 MG/1
300 TABLET ORAL EVERY MORNING
Qty: 90 TABLET | Refills: 0 | Status: SHIPPED | OUTPATIENT
Start: 2023-10-09

## 2023-10-11 ENCOUNTER — OFFICE VISIT (OUTPATIENT)
Dept: OBGYN CLINIC | Facility: CLINIC | Age: 43
End: 2023-10-11
Payer: COMMERCIAL

## 2023-10-11 VITALS — WEIGHT: 216 LBS | HEIGHT: 65 IN | BODY MASS INDEX: 35.99 KG/M2

## 2023-10-11 DIAGNOSIS — G56.21 CUBITAL TUNNEL SYNDROME ON RIGHT: Primary | ICD-10-CM

## 2023-10-11 DIAGNOSIS — R20.0 NUMBNESS AND TINGLING IN RIGHT HAND: ICD-10-CM

## 2023-10-11 DIAGNOSIS — R20.2 NUMBNESS AND TINGLING IN RIGHT HAND: ICD-10-CM

## 2023-10-11 DIAGNOSIS — G56.21 ULNAR NEUROPATHY OF RIGHT UPPER EXTREMITY: ICD-10-CM

## 2023-10-11 DIAGNOSIS — G56.21 IRRITATION OF RIGHT ULNAR NERVE: ICD-10-CM

## 2023-10-11 DIAGNOSIS — Z01.818 PRE-OP TESTING: Primary | ICD-10-CM

## 2023-10-11 PROCEDURE — 99213 OFFICE O/P EST LOW 20 MIN: CPT | Performed by: SURGERY

## 2023-10-11 NOTE — PROGRESS NOTES
ORTHOPAEDIC HAND, WRIST, AND ELBOW OFFICE  VISIT       ASSESSMENT/PLAN:      41 y o female who presents with Right Cubital Tunnel Syndrome with intermittent numbness    Physical exam performed. Ultrasound reviewed. We discussed surgery. Perioperative and postoperative care discussed  All of the risks and benefits of operative treatment were explained to the patient, as well as the risks and benefits of any alternative treatment options, including nonoperative care. This risks of surgery include, but are not limited to, infection, bleeding, blood clot, damage to nerves/arteries, need for further surgery, cardiovascular risk, anesthesia risk, and continued pain. The patient understood this and elects to proceed forward with surgical intervention. Consent signed- Right Cubital Tunnel release  General anesthesia    The patient verbalized understanding of exam findings and treatment plan. We engaged in the shared decision-making process and treatment options were discussed at length with the patient. Surgical and conservative management discussed today along with risks and benefits. Diagnoses and all orders for this visit:    Cubital tunnel syndrome on right  -     Case request operating room: [address] [place] [address]; Standing  -     Case request operating room: RELEASE CUBITAL TUNNEL    Numbness and tingling in right hand  -     Ambulatory Referral to Hand Surgery    Irritation of right ulnar nerve  -     Ambulatory Referral to Hand Surgery    Ulnar neuropathy of right upper extremity  -     Ambulatory Referral to Hand Surgery    Other orders  -     Nursing Communication Warmimg Interventions Implemented; Standing  -     Nursing Communication CHG bath, have staff wash entire body (neck down) per pre-op bathing protocol. Routine, evening prior to, and day of surgery.; Standing  -     Void on call to OR; Standing  -     Insert peripheral IV;  Standing  -     Diet NPO; Sips with meds; Standing        Follow Up:  Return for Post-Op. To Do Next Visit:  Re-evaluation of current issue      General Discussions:  Cubital Tunnel Syndrome: The anatomy and physiology of cubital tunnel syndrome were discussed with the patient today in the office. Typically, increased elbow flexion activities decrease blood flow within the intraneural spaces, resulting in a feeling of numbness, tingling, weakness, or clumsiness within the hand and fingers. Occasionally, anatomic structures such as medial elbow osteophytes, the medial head of the triceps, were subluxing ulnar nerve may result in increased pressure or aggravation at the cubital tunnel. Typical signs and symptoms usually include numbness and tingling within the ring and small finger, weakness with , and weakness with pinch. Conservative treatment and includes nocturnal bracing to keep the elbow in a semi-extended position, activity modification, therapy, and avoiding excessive elbow flexion activities. Vitamin B6 one tablet daily over the counter may helpful to reduce symptoms. A majority of patients typically respond to conservative treatment over a period of approximately 3-6 months. EMG/NCV testing of the ulnar nerve at the elbow is not as reliable as carpal tunnel syndrome. Surgical intervention in the form of in situ release of the ulnar nerve at the elbow or ulnar nerve transposition may be required in up to 20% of patients. Operative Discussions:  Cubital Tunnel Release: The anatomy and physiology of cubital tunnel syndrome were discussed with the patient today in the office. Typically, increased elbow flexion activities decrease blood flow within the intraneural spaces, resulting in a feeling of numbness, tingling, weakness, or clumsiness within the hand and fingers.   Occasionally, anatomic structures such as medial elbow osteophytes, the medial head of the triceps, were subluxing ulnar nerve may result in increased pressure or aggravation at the cubital tunnel. Typical signs and symptoms usually include numbness and tingling within the ring and small finger, weakness with , and weakness with pinch. Conservative treatment and includes nocturnal bracing to keep the elbow in a semi-extended position, activity modification, therapy, and avoiding excessive elbow flexion activities. A majority of patients typically respond to conservative treatment over a period of approximately 3-6 months. Vitamin B6 one tablet daily over the counter may helpful to reduce symptoms. EMG/NCV testing of the ulnar nerve at the elbow is not as reliable as carpal tunnel syndrome. Surgical intervention in the form of in situ release of the ulnar nerve at the elbow or ulnar nerve transposition may be required in up to 20% of patients. The patient has elected to undergo cubital tunnel release. The possibility of converting to a subcutaneous or submuscular ulnar nerve transposition depending on the nerve stability was discussed with the patient. Typically, in the postoperative period, light activities are allowed immediately, driving is allowed when narcotic medications have stopped, and the incision may get wet after 5 days. Heavy activities will be allowed after follow up appointment in 1-2 weeks. While the pain within the ring and small finger of the hands generally improves rapidly, the numbness and tingling, as well as the strength, will slowly improve over a period of weeks to months. Total recovery can take up to 18 months from the time of surgery. Numbness and tingling near the incision, or near the medial aspect of the forearm was discussed with the patient. The patient has an understanding of the above mentioned discussion.  The risks and benefits of the procedure were explained to the patient, which include, but are not limited to: Bleeding, infection, recurrence, pain, scar, damage to tendons, damage to nerves, and damage to blood vessels, failure to give desired results and complications related to anesthesia. These risks, along with alternative conservative treatment options, and postoperative protocols were voiced back and understood by the patient. All questions were answered to the patient's satisfaction. The patient agrees to comply with a standard postoperative protocol, and is willing to proceed. Education was provided via written and auditory forms. There were no barriers to learning. Written handouts regarding wound care, incision and scar care, and general preoperative information was provided to the patient. Prior to surgery, the patient may be requested to stop all anti-inflammatory medications. Prophylactic aspirin, Plavix, and Coumadin may be allowed to be continued. Medications including vitamin E., ginkgo, and fish oil are requested to be stopped approximately one week prior to surgery. Hypertensive medications and beta blockers, if taken, should be continued. Vitamin B6 one tablet daily over the counter may helpful to reduce symptoms. ____________________________________________________________________________________________________________________________________________      CHIEF COMPLAINT:  Chief Complaint   Patient presents with    Right Wrist - Numbness, Pain       SUBJECTIVE:  Soraya Blackwood is a 41y.o. year old female who presents for evalaution of Right hand      Pt states that she has been having increasing symptoms of numbness, tingling and throbbing into her last two digits of her Right hand since August. She reports she recently had neck and shoulder pain/tightness that would radiate down to her hand. She was seen in OT for therapy and her shoulder neck symptoms have cleared up by she continued to have random numbness and tingling into her Right hand. SH has tried a wrist and soft elbow bracing with no changes in her symptoms.     I have personally reviewed all the relevant PMH, PSH, SH, FH, Medications and allergies      PAST MEDICAL HISTORY:  Past Medical History:   Diagnosis Date    Abnormal Pap smear of cervix     Anxiety     Cervical cancer (HCC)     Depression     HPV (human papilloma virus) infection     Moderate dysplasia of cervix ([facility] II)     Last assessed 2016       PAST SURGICAL HISTORY:  Past Surgical History:   Procedure Laterality Date    CERVICAL BIOPSY  W/ LOOP ELECTRODE EXCISION      FOOT SURGERY Right     TONSILLECTOMY AND ADENOIDECTOMY      TUBAL LIGATION         FAMILY HISTORY:  Family History   Problem Relation Age of Onset    Alcohol abuse Mother     Diabetes Father     No Known Problems Sister     No Known Problems Daughter     No Known Problems Daughter     Diabetes Maternal Grandmother     Ovarian cancer Maternal Grandmother     Cancer Maternal Grandmother     Diabetes Paternal Grandmother     No Known Problems Brother     No Known Problems Son     Diabetes Maternal Aunt     No Known Problems Maternal Aunt     No Known Problems Maternal Aunt     No Known Problems Maternal Aunt     No Known Problems Maternal Aunt     No Known Problems Maternal Aunt     No Known Problems Maternal Aunt     Breast cancer additional onset Paternal Aunt     Cancer Paternal Aunt        SOCIAL HISTORY:  Social History     Tobacco Use    Smoking status: Former     Packs/day: 0.00     Years: 20.00     Total pack years: 0.00     Types: Cigarettes     Quit date: 2022     Years since quittin.7     Passive exposure: Never    Smokeless tobacco: Never    Tobacco comments:     1 pk weekly   Vaping Use    Vaping Use: Never used   Substance Use Topics    Alcohol use: No    Drug use: No       MEDICATIONS:    Current Outpatient Medications:     buPROPion (WELLBUTRIN XL) 300 mg 24 hr tablet, TAKE 1 TABLET BY MOUTH EVERY MORNING, Disp: 90 tablet, Rfl: 0    fluticasone (FLONASE) 50 mcg/act nasal spray, 2 sprays into each nostril daily, Disp: 16 g, Rfl: 0    methocarbamol (ROBAXIN) 500 mg tablet, Take 1,000 mg by mouth 4 (four) times a day as needed for muscle spasms (Patient not taking: Reported on 8/19/2023), Disp: , Rfl:     ALLERGIES:  No Known Allergies        REVIEW OF SYSTEMS:  Review of Systems   Constitutional:  Negative for chills and fever. HENT:  Negative for ear pain and sore throat. Eyes:  Negative for pain and visual disturbance. Respiratory:  Negative for cough and shortness of breath. Cardiovascular:  Negative for chest pain and palpitations. Gastrointestinal:  Negative for abdominal pain and vomiting. Genitourinary:  Negative for dysuria and hematuria. Musculoskeletal:  Negative for arthralgias and back pain. Skin:  Negative for color change and rash. Neurological:  Positive for numbness. Negative for seizures and syncope. All other systems reviewed and are negative. VITALS:  There were no vitals filed for this visit. LABS:  HgA1c: No results found for: "HGBA1C"  BMP:   Lab Results   Component Value Date    CALCIUM 9.9 01/03/2023    K 3.6 01/03/2023    CO2 22 01/03/2023     01/03/2023    BUN 11 01/03/2023    CREATININE 0.74 01/03/2023       _____________________________________________________  PHYSICAL EXAMINATION:  General: well developed and well nourished, alert, oriented times 3, and appears comfortable  Psychiatric: Normal  HEENT: Normocephalic, Atraumatic Trachea Midline, No torticollis  Pulmonary: No audible wheezing or respiratory distress   Abdomen/GI: Non tender, non distended   Cardiovascular: No pitting edema, 2+ radial pulse   Skin: No masses, erythema, lacerations, fluctation, ulcerations  Neurovascular: Sensation Intact to the Median, Ulnar, Radial Nerve, Motor Intact to the Median, Ulnar, Radial Nerve, and Pulses Intact  Musculoskeletal: Normal, except as noted in detailed exam and in HPI.       MUSCULOSKELETAL EXAMINATION:  SILT  Composite fist  Positive cubital tunnel compression test    ___________________________________________________  STUDIES REVIEWED:  I have personally reviewed the Ultrasound of Right elbow  which demonstrate   IMPRESSION:     Marked enlargement of the ulnar nerve near/within cubital tunnel, suggestive of ulnar neuropathy/cubital tunnel syndrome.           PROCEDURES PERFORMED:  Procedures  No Procedures performed today    _____________________________________________________      Sienna Wasserman      I,:  Lucretia Vzáquez am acting as a scribe while in the presence of the attending physician.:       I,:  Swapna Rojas MD personally performed the services described in this documentation    as scribed in my presence.:

## 2023-10-25 ENCOUNTER — TELEPHONE (OUTPATIENT)
Dept: OBGYN CLINIC | Facility: HOSPITAL | Age: 43
End: 2023-10-25

## 2023-10-25 NOTE — TELEPHONE ENCOUNTER
Caller: Patient    Doctor: Amado Melendez    Reason for call: Patient is calling asking if she needs to schedule a post op prior to surgery date 11-10-23.   Please advise    Call back#: [phone]

## 2023-11-01 ENCOUNTER — APPOINTMENT (OUTPATIENT)
Dept: LAB | Facility: CLINIC | Age: 43
End: 2023-11-01
Payer: COMMERCIAL

## 2023-11-01 DIAGNOSIS — Z01.818 PRE-OP TESTING: ICD-10-CM

## 2023-11-01 DIAGNOSIS — F33.42 RECURRENT MAJOR DEPRESSIVE DISORDER, IN FULL REMISSION (HCC): ICD-10-CM

## 2023-11-01 LAB
ALBUMIN SERPL BCP-MCNC: 4.4 G/DL (ref 3.5–5)
ALP SERPL-CCNC: 74 U/L (ref 34–104)
ALT SERPL W P-5'-P-CCNC: 17 U/L (ref 7–52)
ANION GAP SERPL CALCULATED.3IONS-SCNC: 4 MMOL/L
AST SERPL W P-5'-P-CCNC: 17 U/L (ref 13–39)
BASOPHILS # BLD AUTO: 0.04 THOUSANDS/ÂΜL (ref 0–0.1)
BASOPHILS NFR BLD AUTO: 1 % (ref 0–1)
BILIRUB SERPL-MCNC: 0.82 MG/DL (ref 0.2–1)
BUN SERPL-MCNC: 10 MG/DL (ref 5–25)
CALCIUM SERPL-MCNC: 9.4 MG/DL (ref 8.4–10.2)
CHLORIDE SERPL-SCNC: 101 MMOL/L (ref 96–108)
CHOLEST SERPL-MCNC: 226 MG/DL
CO2 SERPL-SCNC: 29 MMOL/L (ref 21–32)
CREAT SERPL-MCNC: 0.78 MG/DL (ref 0.6–1.3)
EOSINOPHIL # BLD AUTO: 0.33 THOUSAND/ÂΜL (ref 0–0.61)
EOSINOPHIL NFR BLD AUTO: 4 % (ref 0–6)
ERYTHROCYTE [DISTWIDTH] IN BLOOD BY AUTOMATED COUNT: 12.1 % (ref 11.6–15.1)
GFR SERPL CREATININE-BSD FRML MDRD: 94 ML/MIN/1.73SQ M
GLUCOSE P FAST SERPL-MCNC: 93 MG/DL (ref 65–99)
HCT VFR BLD AUTO: 41.2 % (ref 34.8–46.1)
HDLC SERPL-MCNC: 39 MG/DL
HGB BLD-MCNC: 13.6 G/DL (ref 11.5–15.4)
IMM GRANULOCYTES # BLD AUTO: 0.02 THOUSAND/UL (ref 0–0.2)
IMM GRANULOCYTES NFR BLD AUTO: 0 % (ref 0–2)
LDLC SERPL CALC-MCNC: 154 MG/DL (ref 0–100)
LYMPHOCYTES # BLD AUTO: 2.59 THOUSANDS/ÂΜL (ref 0.6–4.47)
LYMPHOCYTES NFR BLD AUTO: 32 % (ref 14–44)
MCH RBC QN AUTO: 30.5 PG (ref 26.8–34.3)
MCHC RBC AUTO-ENTMCNC: 33 G/DL (ref 31.4–37.4)
MCV RBC AUTO: 92 FL (ref 82–98)
MONOCYTES # BLD AUTO: 0.55 THOUSAND/ÂΜL (ref 0.17–1.22)
MONOCYTES NFR BLD AUTO: 7 % (ref 4–12)
NEUTROPHILS # BLD AUTO: 4.53 THOUSANDS/ÂΜL (ref 1.85–7.62)
NEUTS SEG NFR BLD AUTO: 56 % (ref 43–75)
NONHDLC SERPL-MCNC: 187 MG/DL
NRBC BLD AUTO-RTO: 0 /100 WBCS
PLATELET # BLD AUTO: 320 THOUSANDS/UL (ref 149–390)
PMV BLD AUTO: 11.8 FL (ref 8.9–12.7)
POTASSIUM SERPL-SCNC: 4 MMOL/L (ref 3.5–5.3)
PROT SERPL-MCNC: 6.8 G/DL (ref 6.4–8.4)
RBC # BLD AUTO: 4.46 MILLION/UL (ref 3.81–5.12)
SODIUM SERPL-SCNC: 134 MMOL/L (ref 135–147)
TRIGL SERPL-MCNC: 167 MG/DL
TSH SERPL DL<=0.05 MIU/L-ACNC: 2.34 UIU/ML (ref 0.45–4.5)
WBC # BLD AUTO: 8.06 THOUSAND/UL (ref 4.31–10.16)

## 2023-11-01 PROCEDURE — 84443 ASSAY THYROID STIM HORMONE: CPT

## 2023-11-02 NOTE — PRE-PROCEDURE INSTRUCTIONS
Pre-Surgery Instructions:   Medication Instructions    buPROPion (WELLBUTRIN XL) 300 mg 24 hr tablet Take day of surgery. Medication instructions for day surgery reviewed. Please use only a sip of water to take your instructed medications. Avoid all over the counter vitamins, supplements and NSAIDS for one week prior to surgery per anesthesia guidelines. Tylenol is ok to take as needed. You will receive a call one business day prior to surgery with an arrival time and hospital directions. If your surgery is scheduled on a Monday, the hospital will be calling you on the Friday prior to your surgery. If you have not heard from anyone by 8pm, please call the hospital supervisor through the hospital  at [phone]. Alexsandra Barrios [phone]). Do not eat or drink anything after midnight the night before your surgery, including candy, mints, lifesavers, or chewing gum. Do not drink alcohol 24hrs before your surgery. Try not to smoke at least 24hrs before your surgery. Follow the pre surgery showering instructions as listed in the [facility] Surgical Experience Booklet” or otherwise provided by your surgeon's office. Do not use a blade to shave the surgical area 1 week before surgery. It is okay to use a clean electric clippers up to 24 hours before surgery. Do not apply any lotions, creams, including makeup, cologne, deodorant, or perfumes after showering on the day of your surgery. Do not use dry shampoo, hair spray, hair gel, or any type of hair products. No contact lenses, eye make-up, or artificial eyelashes. Remove nail polish, including gel polish, and any artificial, gel, or acrylic nails if possible. Remove all jewelry including rings and body piercing jewelry. Wear causal clothing that is easy to take on and off. Consider your type of surgery. Keep any valuables, jewelry, piercings at home. Please bring any specially ordered equipment (sling, braces) if indicated.     Arrange for a responsible person to drive you to and from the hospital on the day of your surgery. Visitor Guidelines discussed. Call the surgeon's office with any new illnesses, exposures, or additional questions prior to surgery. Please reference your [facility] Experience Booklet” for additional information to prepare for your upcoming surgery.

## 2023-11-08 ENCOUNTER — ANESTHESIA EVENT (OUTPATIENT)
Dept: PERIOP | Facility: HOSPITAL | Age: 43
End: 2023-11-08
Payer: COMMERCIAL

## 2023-11-10 ENCOUNTER — HOSPITAL ENCOUNTER (OUTPATIENT)
Facility: HOSPITAL | Age: 43
Setting detail: OUTPATIENT SURGERY
Discharge: HOME | End: 2023-11-10
Attending: SURGERY | Admitting: SURGERY
Payer: COMMERCIAL

## 2023-11-10 ENCOUNTER — ANESTHESIA (OUTPATIENT)
Dept: PERIOP | Facility: HOSPITAL | Age: 43
End: 2023-11-10
Payer: COMMERCIAL

## 2023-11-10 VITALS
HEART RATE: 78 BPM | TEMPERATURE: 97.9 F | RESPIRATION RATE: 16 BRPM | OXYGEN SATURATION: 97 % | BODY MASS INDEX: 35.48 KG/M2 | WEIGHT: 213.19 LBS | DIASTOLIC BLOOD PRESSURE: 57 MMHG | SYSTOLIC BLOOD PRESSURE: 118 MMHG

## 2023-11-10 DIAGNOSIS — G56.21 ULNAR NEUROPATHY OF RIGHT UPPER EXTREMITY: Primary | ICD-10-CM

## 2023-11-10 PROBLEM — E66.812 CLASS 2 OBESITY DUE TO EXCESS CALORIES WITH BODY MASS INDEX (BMI) OF 35.0 TO 35.9 IN ADULT: Status: ACTIVE | Noted: 2023-11-10

## 2023-11-10 PROBLEM — E66.09 CLASS 2 OBESITY DUE TO EXCESS CALORIES WITH BODY MASS INDEX (BMI) OF 35.0 TO 35.9 IN ADULT: Status: ACTIVE | Noted: 2023-11-10

## 2023-11-10 PROBLEM — F41.9 ANXIETY: Status: ACTIVE | Noted: 2023-11-10

## 2023-11-10 PROBLEM — Z78.9 EX-SMOKER FOR LESS THAN 1 YEAR: Status: ACTIVE | Noted: 2023-11-10

## 2023-11-10 LAB
EXT PREGNANCY TEST URINE: NEGATIVE
EXT. CONTROL: NORMAL

## 2023-11-10 PROCEDURE — 81025 URINE PREGNANCY TEST: CPT | Performed by: SURGERY

## 2023-11-10 PROCEDURE — 64718 REVISE ULNAR NERVE AT ELBOW: CPT | Performed by: PHYSICIAN ASSISTANT

## 2023-11-10 PROCEDURE — 64718 REVISE ULNAR NERVE AT ELBOW: CPT | Performed by: SURGERY

## 2023-11-10 PROCEDURE — 99024 POSTOP FOLLOW-UP VISIT: CPT | Performed by: SURGERY

## 2023-11-10 RX ORDER — LIDOCAINE HYDROCHLORIDE 20 MG/ML
INJECTION, SOLUTION EPIDURAL; INFILTRATION; INTRACAUDAL; PERINEURAL AS NEEDED
Status: DISCONTINUED | OUTPATIENT
Start: 2023-11-10 | End: 2023-11-10

## 2023-11-10 RX ORDER — ACETAMINOPHEN 325 MG/1
650 TABLET ORAL EVERY 6 HOURS PRN
Status: DISCONTINUED | OUTPATIENT
Start: 2023-11-10 | End: 2023-11-10 | Stop reason: HOSPADM

## 2023-11-10 RX ORDER — OXYCODONE HYDROCHLORIDE 5 MG/1
5 TABLET ORAL EVERY 4 HOURS PRN
Status: DISCONTINUED | OUTPATIENT
Start: 2023-11-10 | End: 2023-11-10 | Stop reason: HOSPADM

## 2023-11-10 RX ORDER — PROPOFOL 10 MG/ML
INJECTION, EMULSION INTRAVENOUS AS NEEDED
Status: DISCONTINUED | OUTPATIENT
Start: 2023-11-10 | End: 2023-11-10

## 2023-11-10 RX ORDER — FENTANYL CITRATE 50 UG/ML
INJECTION, SOLUTION INTRAMUSCULAR; INTRAVENOUS AS NEEDED
Status: DISCONTINUED | OUTPATIENT
Start: 2023-11-10 | End: 2023-11-10

## 2023-11-10 RX ORDER — MIDAZOLAM HYDROCHLORIDE 2 MG/2ML
INJECTION, SOLUTION INTRAMUSCULAR; INTRAVENOUS AS NEEDED
Status: DISCONTINUED | OUTPATIENT
Start: 2023-11-10 | End: 2023-11-10

## 2023-11-10 RX ORDER — ONDANSETRON 2 MG/ML
4 INJECTION INTRAMUSCULAR; INTRAVENOUS ONCE AS NEEDED
Status: DISCONTINUED | OUTPATIENT
Start: 2023-11-10 | End: 2023-11-10 | Stop reason: HOSPADM

## 2023-11-10 RX ORDER — OXYCODONE HYDROCHLORIDE AND ACETAMINOPHEN 5; 325 MG/1; MG/1
1 TABLET ORAL EVERY 12 HOURS PRN
Qty: 10 TABLET | Refills: 0 | Status: SHIPPED | OUTPATIENT
Start: 2023-11-10

## 2023-11-10 RX ORDER — EPHEDRINE SULFATE 50 MG/ML
INJECTION INTRAVENOUS AS NEEDED
Status: DISCONTINUED | OUTPATIENT
Start: 2023-11-10 | End: 2023-11-10

## 2023-11-10 RX ORDER — BUPIVACAINE HYDROCHLORIDE 2.5 MG/ML
INJECTION, SOLUTION EPIDURAL; INFILTRATION; INTRACAUDAL AS NEEDED
Status: DISCONTINUED | OUTPATIENT
Start: 2023-11-10 | End: 2023-11-10 | Stop reason: HOSPADM

## 2023-11-10 RX ORDER — ONDANSETRON 2 MG/ML
INJECTION INTRAMUSCULAR; INTRAVENOUS AS NEEDED
Status: DISCONTINUED | OUTPATIENT
Start: 2023-11-10 | End: 2023-11-10

## 2023-11-10 RX ORDER — DEXAMETHASONE SODIUM PHOSPHATE 10 MG/ML
INJECTION, SOLUTION INTRAMUSCULAR; INTRAVENOUS AS NEEDED
Status: DISCONTINUED | OUTPATIENT
Start: 2023-11-10 | End: 2023-11-10

## 2023-11-10 RX ORDER — FENTANYL CITRATE/PF 50 MCG/ML
25 SYRINGE (ML) INJECTION
Status: DISCONTINUED | OUTPATIENT
Start: 2023-11-10 | End: 2023-11-10 | Stop reason: HOSPADM

## 2023-11-10 RX ORDER — SODIUM CHLORIDE 9 MG/ML
125 INJECTION, SOLUTION INTRAVENOUS CONTINUOUS
Status: DISCONTINUED | OUTPATIENT
Start: 2023-11-10 | End: 2023-11-10 | Stop reason: HOSPADM

## 2023-11-10 RX ADMIN — MIDAZOLAM 2 MG: 1 INJECTION INTRAMUSCULAR; INTRAVENOUS at 10:31

## 2023-11-10 RX ADMIN — EPHEDRINE SULFATE 5 MG: 50 INJECTION, SOLUTION INTRAVENOUS at 10:41

## 2023-11-10 RX ADMIN — FENTANYL CITRATE 25 MCG: 50 INJECTION INTRAMUSCULAR; INTRAVENOUS at 11:35

## 2023-11-10 RX ADMIN — ACETAMINOPHEN 325MG 650 MG: 325 TABLET ORAL at 12:33

## 2023-11-10 RX ADMIN — DEXAMETHASONE SODIUM PHOSPHATE 10 MG: 10 INJECTION INTRAMUSCULAR; INTRAVENOUS at 10:38

## 2023-11-10 RX ADMIN — PROPOFOL 200 MG: 10 INJECTION, EMULSION INTRAVENOUS at 10:36

## 2023-11-10 RX ADMIN — LIDOCAINE HYDROCHLORIDE 100 MG: 20 INJECTION, SOLUTION EPIDURAL; INFILTRATION; INTRACAUDAL at 10:36

## 2023-11-10 RX ADMIN — FENTANYL CITRATE 25 MCG: 50 INJECTION INTRAMUSCULAR; INTRAVENOUS at 11:29

## 2023-11-10 RX ADMIN — OXYCODONE HYDROCHLORIDE 5 MG: 5 TABLET ORAL at 12:33

## 2023-11-10 RX ADMIN — ONDANSETRON 4 MG: 2 INJECTION INTRAMUSCULAR; INTRAVENOUS at 10:38

## 2023-11-10 RX ADMIN — FENTANYL CITRATE 25 MCG: 50 INJECTION INTRAMUSCULAR; INTRAVENOUS at 10:51

## 2023-11-10 RX ADMIN — SODIUM CHLORIDE 125 ML/HR: 0.9 INJECTION, SOLUTION INTRAVENOUS at 09:23

## 2023-11-10 RX ADMIN — FENTANYL CITRATE 100 MCG: 50 INJECTION INTRAMUSCULAR; INTRAVENOUS at 10:36

## 2023-11-10 RX ADMIN — FENTANYL CITRATE 25 MCG: 50 INJECTION INTRAMUSCULAR; INTRAVENOUS at 10:59

## 2023-11-10 NOTE — ANESTHESIA PREPROCEDURE EVALUATION
Procedure:  RELEASE CUBITAL TUNNEL (Right: Elbow)    Relevant Problems   CARDIO   (+) Migraine without aura and without status migrainosus, not intractable      MUSCULOSKELETAL   (+) Lower back pain      NEURO/PSYCH   (+) Anxiety   (+) Depression   (+) Migraine without aura and without status migrainosus, not intractable      Other   (+) Class 2 obesity due to excess calories with body mass index (BMI) of 35.0 to 35.9 in adult   (+) Ex-smoker for less than 1 year        Physical Exam    Airway    Mallampati score: II  TM Distance: >3 FB  Neck ROM: full     Dental   No notable dental hx     Cardiovascular  Rhythm: regular, Rate: normal, Cardiovascular exam normal    Pulmonary   Breath sounds clear to auscultation    Other Findings      Anesthesia Plan  ASA Score- 2     Anesthesia Type- general with ASA Monitors. Additional Monitors:     Airway Plan: LMA. Plan Factors-    Chart reviewed. Existing labs reviewed. Patient summary reviewed. Patient is not a current smoker. Patient instructed to abstain from smoking on day of procedure. Patient did not smoke on day of surgery. There is medical exclusion for perioperative obstructive sleep apnea risk education. Induction- intravenous. Postoperative Plan-     Informed Consent- Anesthetic plan and risks discussed with patient.

## 2023-11-10 NOTE — INTERVAL H&P NOTE
H&P reviewed. After examining the patient I find no changes in the patients condition since the H&P had been written.     Vitals:    11/10/23 0858   BP: 118/73   Pulse: 74   Resp: 16   Temp: 97.8 °F (36.6 °C)   SpO2: 97%

## 2023-11-10 NOTE — ANESTHESIA POSTPROCEDURE EVALUATION
Post-Op Assessment Note    CV Status:  Stable    Pain management: adequate     Mental Status:  Alert and awake   Hydration Status:  Euvolemic   PONV Controlled:  Controlled   Airway Patency:  Patent      Post Op Vitals Reviewed: Yes      Staff: Anesthesiologist, CRNA         No notable events documented.     BP      Temp     Pulse     Resp      SpO2      BP 98/56   Pulse 72   Temp (!) 97 °F (36.1 °C)   Resp 17   Wt 96.7 kg (213 lb 3 oz)   LMP 10/16/2023   SpO2 96%   BMI 35.48 kg/m²

## 2023-11-10 NOTE — OP NOTE
OPERATIVE REPORT  PATIENT NAME: Rafa Jeffrey    :  1980  MRN: [id number]  Pt Location: [facility] OR ROOM 06    SURGERY DATE: 11/10/2023    Surgeon(s) and Role:     Tucker Curtis MD - Primary     * Belinda Pena - Assisting    Preop Diagnosis:  Cubital tunnel syndrome on right [G56.21]    Post-Op Diagnosis Codes:     * Cubital tunnel syndrome on right [G56.21]    Procedure(s):  Right - RELEASE CUBITAL TUNNEL    Specimen(s):  * No specimens in log *    Estimated Blood Loss:   0 mL    Drains:  * No LDAs found *    Anesthesia Type:   General    Operative Indications:  Cubital tunnel syndrome on right [G56.21]      Operative Findings:  Healthy appearing ulnar nerve after release    Complications:   None    Procedure and Technique: The right upper extremity was prepped and draped in a sterile fashion. A sterile tourniquet was applied. An esmarch was used to exsanguinate the right upper extremity, and the tourniquet was insufflated to 250 mm Hg. A curvilinear incision was made anterior to the path of the ulnar nerve at the level of the medial elbow. A combination of blunt and sharp dissection was performed through the subcutaneous tissues down to the level of the cubital tunnel. The cubital tunnel was opened to expose the underlying ulnar nerve. The release was then extended proximally and distally from the medial epicondyle for a distance 6-8 cm. The ulnar nerve was healthy in appearance. The wounds irrigated copiously with normal saline. The skin was approximated with 3-0 deep dermal Vicryl followed by glue and Steri-Strips. A field block was performed with Marcaine 0.25% plain. An ace bandage over wrap from the hand to the shoulder was applied with additional gauze placed along the cubital tunnel release during the wrap. The tourniquet was released and the patient was extubated. The patient was transferred to recovery room in stable condition. I was present for the entire procedure.     Patient Disposition:  PACU     My Assistant was necessary throughout the procedure(s) for retraction and positioning. I understand that section 1842 (b)(7)(D) of the [address] generally prohibits Medicare physician fee schedule payment for the services of assistants-at-surgery in teaching hospitals when qualified residents are available to furnish such services. I certify that the services for which payment is claimed were medically necessary, and that no qualified resident was available to perform the services. I further understand that these services are subject to post-payment review by the Medicare carrier.       SIGNATURE: Josefina Paz MD  DATE: November 10, 2023  TIME: 11:04 AM

## 2023-11-10 NOTE — H&P
Hand Surgery H&P    ASSESSMENT/PLAN:       41 y o female who presents with Right Cubital Tunnel Syndrome with intermittent numbness     Physical exam performed. Ultrasound reviewed. We discussed surgery. Perioperative and postoperative care discussed  All of the risks and benefits of operative treatment were explained to the patient, as well as the risks and benefits of any alternative treatment options, including nonoperative care. This risks of surgery include, but are not limited to, infection, bleeding, blood clot, damage to nerves/arteries, need for further surgery, cardiovascular risk, anesthesia risk, and continued pain. The patient understood this and elects to proceed forward with surgical intervention. Consent on chart- Right Cubital Tunnel release  General anesthesia     The patient verbalized understanding of exam findings and treatment plan. We engaged in the shared decision-making process and treatment options were discussed at length with the patient. Surgical and conservative management discussed today along with risks and benefits. Diagnoses and all orders for this visit:     Cubital tunnel syndrome on right  -     Case request operating room: [address] [place] [address]; Standing  -     Case request operating room: RELEASE CUBITAL TUNNEL     Numbness and tingling in right hand  -     Ambulatory Referral to Hand Surgery     Irritation of right ulnar nerve  -     Ambulatory Referral to Hand Surgery     Ulnar neuropathy of right upper extremity  -     Ambulatory Referral to Hand Surgery     Other orders  -     Nursing Communication Warmimg Interventions Implemented; Standing  -     Nursing Communication CHG bath, have staff wash entire body (neck down) per pre-op bathing protocol. Routine, evening prior to, and day of surgery.; Standing  -     Void on call to OR; Standing  -     Insert peripheral IV; Standing  -     Diet NPO; Sips with meds; Standing           Follow Up:  Return for Post-Op.      To Do Next Visit:  Re-evaluation of current issue        General Discussions:  Cubital Tunnel Syndrome: The anatomy and physiology of cubital tunnel syndrome were discussed with the patient today in the office. Typically, increased elbow flexion activities decrease blood flow within the intraneural spaces, resulting in a feeling of numbness, tingling, weakness, or clumsiness within the hand and fingers. Occasionally, anatomic structures such as medial elbow osteophytes, the medial head of the triceps, were subluxing ulnar nerve may result in increased pressure or aggravation at the cubital tunnel. Typical signs and symptoms usually include numbness and tingling within the ring and small finger, weakness with , and weakness with pinch. Conservative treatment and includes nocturnal bracing to keep the elbow in a semi-extended position, activity modification, therapy, and avoiding excessive elbow flexion activities. Vitamin B6 one tablet daily over the counter may helpful to reduce symptoms. A majority of patients typically respond to conservative treatment over a period of approximately 3-6 months. EMG/NCV testing of the ulnar nerve at the elbow is not as reliable as carpal tunnel syndrome. Surgical intervention in the form of in situ release of the ulnar nerve at the elbow or ulnar nerve transposition may be required in up to 20% of patients. Operative Discussions:  Cubital Tunnel Release: The anatomy and physiology of cubital tunnel syndrome were discussed with the patient today in the office. Typically, increased elbow flexion activities decrease blood flow within the intraneural spaces, resulting in a feeling of numbness, tingling, weakness, or clumsiness within the hand and fingers. Occasionally, anatomic structures such as medial elbow osteophytes, the medial head of the triceps, were subluxing ulnar nerve may result in increased pressure or aggravation at the cubital tunnel.   Typical signs and symptoms usually include numbness and tingling within the ring and small finger, weakness with , and weakness with pinch. Conservative treatment and includes nocturnal bracing to keep the elbow in a semi-extended position, activity modification, therapy, and avoiding excessive elbow flexion activities. A majority of patients typically respond to conservative treatment over a period of approximately 3-6 months. Vitamin B6 one tablet daily over the counter may helpful to reduce symptoms. EMG/NCV testing of the ulnar nerve at the elbow is not as reliable as carpal tunnel syndrome. Surgical intervention in the form of in situ release of the ulnar nerve at the elbow or ulnar nerve transposition may be required in up to 20% of patients. The patient has elected to undergo cubital tunnel release. The possibility of converting to a subcutaneous or submuscular ulnar nerve transposition depending on the nerve stability was discussed with the patient. Typically, in the postoperative period, light activities are allowed immediately, driving is allowed when narcotic medications have stopped, and the incision may get wet after 5 days. Heavy activities will be allowed after follow up appointment in 1-2 weeks. While the pain within the ring and small finger of the hands generally improves rapidly, the numbness and tingling, as well as the strength, will slowly improve over a period of weeks to months. Total recovery can take up to 18 months from the time of surgery. Numbness and tingling near the incision, or near the medial aspect of the forearm was discussed with the patient. The patient has an understanding of the above mentioned discussion.  The risks and benefits of the procedure were explained to the patient, which include, but are not limited to: Bleeding, infection, recurrence, pain, scar, damage to tendons, damage to nerves, and damage to blood vessels, failure to give desired results and complications related to anesthesia. These risks, along with alternative conservative treatment options, and postoperative protocols were voiced back and understood by the patient. All questions were answered to the patient's satisfaction. The patient agrees to comply with a standard postoperative protocol, and is willing to proceed. Education was provided via written and auditory forms. There were no barriers to learning. Written handouts regarding wound care, incision and scar care, and general preoperative information was provided to the patient. Prior to surgery, the patient may be requested to stop all anti-inflammatory medications. Prophylactic aspirin, Plavix, and Coumadin may be allowed to be continued. Medications including vitamin E., ginkgo, and fish oil are requested to be stopped approximately one week prior to surgery. Hypertensive medications and beta blockers, if taken, should be continued. Vitamin B6 one tablet daily over the counter may helpful to reduce symptoms. ____________________________________________________________________________________________________________________________________________        CHIEF COMPLAINT:      Chief Complaint   Patient presents with    Right Wrist - Numbness, Pain         SUBJECTIVE:  Tiffanie Hampton is a 41y.o. year old female who presents for evalaution of Right hand      Pt states that she has been having increasing symptoms of numbness, tingling and throbbing into her last two digits of her Right hand since August. She reports she recently had neck and shoulder pain/tightness that would radiate down to her hand. She was seen in OT for therapy and her shoulder neck symptoms have cleared up by she continued to have random numbness and tingling into her Right hand. SH has tried a wrist and soft elbow bracing with no changes in her symptoms.      I have personally reviewed all the relevant PMH, PSH, SH, FH, Medications and allergies        PAST MEDICAL HISTORY:  Medical History        Past Medical History:   Diagnosis Date    Abnormal Pap smear of cervix      Anxiety      Cervical cancer (HCC)      Depression      HPV (human papilloma virus) infection      Moderate dysplasia of cervix ([facility])       Last assessed 2016            PAST SURGICAL HISTORY:  Surgical History         Past Surgical History:   Procedure Laterality Date    CERVICAL BIOPSY  W/ LOOP ELECTRODE EXCISION        FOOT SURGERY Right      TONSILLECTOMY AND ADENOIDECTOMY        TUBAL LIGATION                FAMILY HISTORY:  Family History         Family History   Problem Relation Age of Onset    Alcohol abuse Mother      Diabetes Father      No Known Problems Sister      No Known Problems Daughter      No Known Problems Daughter      Diabetes Maternal Grandmother      Ovarian cancer Maternal Grandmother      Cancer Maternal Grandmother      Diabetes Paternal Grandmother      No Known Problems Brother      No Known Problems Son      Diabetes Maternal Aunt      No Known Problems Maternal Aunt      No Known Problems Maternal Aunt      No Known Problems Maternal Aunt      No Known Problems Maternal Aunt      No Known Problems Maternal Aunt      No Known Problems Maternal Aunt      Breast cancer additional onset Paternal Aunt      Cancer Paternal Aunt              SOCIAL HISTORY:  Social History            Tobacco Use    Smoking status: Former       Packs/day: 0.00       Years: 20.00       Total pack years: 0.00       Types: Cigarettes       Quit date: 2022       Years since quittin.7       Passive exposure: Never    Smokeless tobacco: Never    Tobacco comments:       1 pk weekly   Vaping Use    Vaping Use: Never used   Substance Use Topics    Alcohol use: No    Drug use: No         MEDICATIONS:     Current Outpatient Medications:     buPROPion (WELLBUTRIN XL) 300 mg 24 hr tablet, TAKE 1 TABLET BY MOUTH EVERY MORNING, Disp: 90 tablet, Rfl: 0    fluticasone (FLONASE) 50 mcg/act nasal spray, 2 sprays into each nostril daily, Disp: 16 g, Rfl: 0    methocarbamol (ROBAXIN) 500 mg tablet, Take 1,000 mg by mouth 4 (four) times a day as needed for muscle spasms (Patient not taking: Reported on 8/19/2023), Disp: , Rfl:      ALLERGIES:  No Known Allergies           REVIEW OF SYSTEMS:  Review of Systems   Constitutional:  Negative for chills and fever. HENT:  Negative for ear pain and sore throat. Eyes:  Negative for pain and visual disturbance. Respiratory:  Negative for cough and shortness of breath. Cardiovascular:  Negative for chest pain and palpitations. Gastrointestinal:  Negative for abdominal pain and vomiting. Genitourinary:  Negative for dysuria and hematuria. Musculoskeletal:  Negative for arthralgias and back pain. Skin:  Negative for color change and rash. Neurological:  Positive for numbness. Negative for seizures and syncope. All other systems reviewed and are negative. VITALS:  There were no vitals filed for this visit. LABS:  HgA1c: No results found for: "HGBA1C"  BMP:         Lab Results   Component Value Date     CALCIUM 9.9 01/03/2023     K 3.6 01/03/2023     CO2 22 01/03/2023      01/03/2023     BUN 11 01/03/2023     CREATININE 0.74 01/03/2023         _____________________________________________________  PHYSICAL EXAMINATION:  General: well developed and well nourished, alert, oriented times 3, and appears comfortable  Psychiatric: Normal  HEENT: Normocephalic, Atraumatic Trachea Midline, No torticollis  Pulmonary: No audible wheezing or respiratory distress   Abdomen/GI: Non tender, non distended   Cardiovascular: No pitting edema, 2+ radial pulse   Skin: No masses, erythema, lacerations, fluctation, ulcerations  Neurovascular: Sensation Intact to the Median, Ulnar, Radial Nerve, Motor Intact to the Median, Ulnar, Radial Nerve, and Pulses Intact  Musculoskeletal: Normal, except as noted in detailed exam and in HPI.         MUSCULOSKELETAL EXAMINATION:  SILT  Composite fist  Positive cubital tunnel compression test     ___________________________________________________  STUDIES REVIEWED:  I have personally reviewed the Ultrasound of Right elbow  which demonstrate   IMPRESSION:     Marked enlargement of the ulnar nerve near/within cubital tunnel, suggestive of ulnar neuropathy/cubital tunnel syndrome.

## 2023-11-10 NOTE — DISCHARGE INSTR - AVS FIRST PAGE
Elevate hand above heart as much as possible to help pain and swelling. May use hand for simple tasks, but no heavy lifting or tight squeezing x 4 weeks. Keep operative bandage clean and dry. You may remove bandage in 4 days, just leave steri strips over incision. You are permitted to shower after 4 days with dressing off. Steri strips will fall off over the course of a few days. Perform simple finger motion exercises: opening & closing fingers 10 x every hr. Follow-up in the office 11/21/2023 per your scheduled appointment.

## 2023-11-21 ENCOUNTER — OFFICE VISIT (OUTPATIENT)
Dept: OBGYN CLINIC | Facility: CLINIC | Age: 43
End: 2023-11-21

## 2023-11-21 VITALS
WEIGHT: 213 LBS | BODY MASS INDEX: 35.49 KG/M2 | SYSTOLIC BLOOD PRESSURE: 100 MMHG | HEIGHT: 65 IN | DIASTOLIC BLOOD PRESSURE: 62 MMHG

## 2023-11-21 DIAGNOSIS — G56.21 ULNAR NEUROPATHY OF RIGHT UPPER EXTREMITY: Primary | ICD-10-CM

## 2023-11-21 PROCEDURE — 99024 POSTOP FOLLOW-UP VISIT: CPT | Performed by: PHYSICIAN ASSISTANT

## 2023-11-21 NOTE — PROGRESS NOTES
[facility]  Patient Name:  Snehal Tucker  Surgery:  Right cubital tunnel release   Surgery Date:  11/10/2023      Subjective:  Patient reports improvement in motion of the hand, and improvement in numbness/tingling in the 4-5 digits of the right hand since surgery. She has a lot of elbow incisional tenderness/soreness. No fever/chills. Objective:  Vitals:    11/21/23 1043   BP: 100/62     Right elbow  Medial based incision is healed with scab. Mild elysia-incisional swelling, normal postop. No erythema, no wound dehiscence  Normal elbow wrist range of motion, full composite fist  Sensation intact to light touch in the ulnar nerve distribution. Normal to point discrimination testing  The upper extremities warm and well-perfused  Palpable radial pulse. Assessment:  S/p Right cubital tunnel release 11/10/2023. Improved numbness and tingling, has incisional soreness/tenderness. Plan: Activities as tolerated and increase as able. No specific restrictions from our standpoint. Over-the-counter ibuprofen recommended consistently for 3 to 5 days, then as needed,which should reduce some inflammation and soreness. Ice to the area as well as massage and moisturize should reduce some of the symptoms as well. Follow-up PRN, call us with any questions or concerns.

## 2023-12-13 ENCOUNTER — TELEPHONE (OUTPATIENT)
Age: 43
End: 2023-12-13

## 2023-12-13 NOTE — TELEPHONE ENCOUNTER
Caller: Patient     Doctor: Jarad Cotter    Reason for call: Patient wants to return to work with no restrictions. Her employer is asking for a work status letter stating she can return to work on 12/13/23.     [facility]/[place]  Fax #: [phone]    Call back#: [phone]

## 2023-12-20 ENCOUNTER — HOSPITAL ENCOUNTER (EMERGENCY)
Facility: HOSPITAL | Age: 43
Discharge: HOME | End: 2023-12-20
Attending: EMERGENCY MEDICINE
Payer: COMMERCIAL

## 2023-12-20 ENCOUNTER — APPOINTMENT (EMERGENCY)
Dept: RADIOLOGY | Facility: HOSPITAL | Age: 43
End: 2023-12-20
Payer: COMMERCIAL

## 2023-12-20 ENCOUNTER — NURSE TRIAGE (OUTPATIENT)
Age: 43
End: 2023-12-20

## 2023-12-20 VITALS
SYSTOLIC BLOOD PRESSURE: 109 MMHG | HEART RATE: 99 BPM | TEMPERATURE: 98.6 F | RESPIRATION RATE: 22 BRPM | OXYGEN SATURATION: 98 % | DIASTOLIC BLOOD PRESSURE: 76 MMHG

## 2023-12-20 DIAGNOSIS — J10.1 INFLUENZA A: Primary | ICD-10-CM

## 2023-12-20 LAB
ALBUMIN SERPL BCP-MCNC: 4.3 G/DL (ref 3.5–5)
ALP SERPL-CCNC: 69 U/L (ref 34–104)
ALT SERPL W P-5'-P-CCNC: 19 U/L (ref 7–52)
ANION GAP SERPL CALCULATED.3IONS-SCNC: 11 MMOL/L
AST SERPL W P-5'-P-CCNC: 21 U/L (ref 13–39)
ATRIAL RATE: 89 BPM
BASOPHILS # BLD AUTO: 0.03 THOUSANDS/ÂΜL (ref 0–0.1)
BASOPHILS NFR BLD AUTO: 1 % (ref 0–1)
BILIRUB SERPL-MCNC: 0.66 MG/DL (ref 0.2–1)
BUN SERPL-MCNC: 10 MG/DL (ref 5–25)
CALCIUM SERPL-MCNC: 9 MG/DL (ref 8.4–10.2)
CARDIAC TROPONIN I PNL SERPL HS: <2 NG/L
CHLORIDE SERPL-SCNC: 105 MMOL/L (ref 96–108)
CO2 SERPL-SCNC: 22 MMOL/L (ref 21–32)
CREAT SERPL-MCNC: 0.85 MG/DL (ref 0.6–1.3)
D DIMER PPP FEU-MCNC: <0.27 UG/ML FEU
EOSINOPHIL # BLD AUTO: 0.08 THOUSAND/ÂΜL (ref 0–0.61)
EOSINOPHIL NFR BLD AUTO: 2 % (ref 0–6)
ERYTHROCYTE [DISTWIDTH] IN BLOOD BY AUTOMATED COUNT: 12.2 % (ref 11.6–15.1)
FLUAV RNA RESP QL NAA+PROBE: POSITIVE
FLUBV RNA RESP QL NAA+PROBE: NEGATIVE
GFR SERPL CREATININE-BSD FRML MDRD: 85 ML/MIN/1.73SQ M
GLUCOSE SERPL-MCNC: 87 MG/DL (ref 65–140)
HCT VFR BLD AUTO: 41.6 % (ref 34.8–46.1)
HGB BLD-MCNC: 13.8 G/DL (ref 11.5–15.4)
IMM GRANULOCYTES # BLD AUTO: 0.01 THOUSAND/UL (ref 0–0.2)
IMM GRANULOCYTES NFR BLD AUTO: 0 % (ref 0–2)
LYMPHOCYTES # BLD AUTO: 1.58 THOUSANDS/ÂΜL (ref 0.6–4.47)
LYMPHOCYTES NFR BLD AUTO: 40 % (ref 14–44)
MCH RBC QN AUTO: 29.9 PG (ref 26.8–34.3)
MCHC RBC AUTO-ENTMCNC: 33.2 G/DL (ref 31.4–37.4)
MCV RBC AUTO: 90 FL (ref 82–98)
MONOCYTES # BLD AUTO: 0.57 THOUSAND/ÂΜL (ref 0.17–1.22)
MONOCYTES NFR BLD AUTO: 14 % (ref 4–12)
NEUTROPHILS # BLD AUTO: 1.73 THOUSANDS/ÂΜL (ref 1.85–7.62)
NEUTS SEG NFR BLD AUTO: 43 % (ref 43–75)
NRBC BLD AUTO-RTO: 0 /100 WBCS
PLATELET # BLD AUTO: 232 THOUSANDS/UL (ref 149–390)
PMV BLD AUTO: 11 FL (ref 8.9–12.7)
POTASSIUM SERPL-SCNC: 3.3 MMOL/L (ref 3.5–5.3)
PR INTERVAL: 106 MS
PROT SERPL-MCNC: 7.3 G/DL (ref 6.4–8.4)
QRS AXIS: 67 DEGREES
QRSD INTERVAL: 80 MS
QT INTERVAL: 346 MS
QTC INTERVAL: 420 MS
RBC # BLD AUTO: 4.62 MILLION/UL (ref 3.81–5.12)
RSV RNA RESP QL NAA+PROBE: NEGATIVE
SARS-COV-2 RNA RESP QL NAA+PROBE: NEGATIVE
SODIUM SERPL-SCNC: 138 MMOL/L (ref 135–147)
T WAVE AXIS: 51 DEGREES
VENTRICULAR RATE: 89 BPM
WBC # BLD AUTO: 4 THOUSAND/UL (ref 4.31–10.16)

## 2023-12-20 PROCEDURE — 94640 AIRWAY INHALATION TREATMENT: CPT

## 2023-12-20 PROCEDURE — 80053 COMPREHEN METABOLIC PANEL: CPT | Performed by: EMERGENCY MEDICINE

## 2023-12-20 PROCEDURE — 99285 EMERGENCY DEPT VISIT HI MDM: CPT | Performed by: EMERGENCY MEDICINE

## 2023-12-20 PROCEDURE — 0241U HB NFCT DS VIR RESP RNA 4 TRGT: CPT | Performed by: EMERGENCY MEDICINE

## 2023-12-20 PROCEDURE — 71046 X-RAY EXAM CHEST 2 VIEWS: CPT

## 2023-12-20 PROCEDURE — 99285 EMERGENCY DEPT VISIT HI MDM: CPT

## 2023-12-20 PROCEDURE — 96360 HYDRATION IV INFUSION INIT: CPT

## 2023-12-20 PROCEDURE — 85025 COMPLETE CBC W/AUTO DIFF WBC: CPT | Performed by: EMERGENCY MEDICINE

## 2023-12-20 PROCEDURE — 93005 ELECTROCARDIOGRAM TRACING: CPT

## 2023-12-20 PROCEDURE — 85379 FIBRIN DEGRADATION QUANT: CPT | Performed by: EMERGENCY MEDICINE

## 2023-12-20 PROCEDURE — 96361 HYDRATE IV INFUSION ADD-ON: CPT

## 2023-12-20 PROCEDURE — 36415 COLL VENOUS BLD VENIPUNCTURE: CPT | Performed by: EMERGENCY MEDICINE

## 2023-12-20 PROCEDURE — 84484 ASSAY OF TROPONIN QUANT: CPT | Performed by: EMERGENCY MEDICINE

## 2023-12-20 RX ORDER — ALBUTEROL SULFATE 90 UG/1
2 AEROSOL, METERED RESPIRATORY (INHALATION) ONCE
Status: COMPLETED | OUTPATIENT
Start: 2023-12-20 | End: 2023-12-20

## 2023-12-20 RX ORDER — POTASSIUM CHLORIDE 20 MEQ/1
40 TABLET, EXTENDED RELEASE ORAL ONCE
Status: COMPLETED | OUTPATIENT
Start: 2023-12-20 | End: 2023-12-20

## 2023-12-20 RX ADMIN — SODIUM CHLORIDE 1000 ML: 0.9 INJECTION, SOLUTION INTRAVENOUS at 12:51

## 2023-12-20 RX ADMIN — POTASSIUM CHLORIDE 40 MEQ: 1500 TABLET, EXTENDED RELEASE ORAL at 14:10

## 2023-12-20 RX ADMIN — ALBUTEROL SULFATE 2 PUFF: 90 AEROSOL, METERED RESPIRATORY (INHALATION) at 14:42

## 2023-12-20 RX ADMIN — IPRATROPIUM BROMIDE 0.5 MG: 0.5 SOLUTION RESPIRATORY (INHALATION) at 12:35

## 2023-12-20 RX ADMIN — ALBUTEROL SULFATE 5 MG: 2.5 SOLUTION RESPIRATORY (INHALATION) at 12:35

## 2023-12-20 NOTE — ED ATTENDING ATTESTATION
12/20/2023  I, Cristina Manning MD, saw and evaluated the patient. I have discussed the patient with the resident/non-physician practitioner and agree with the resident's/non-physician practitioner's findings, Plan of Care, and MDM as documented in the resident's/non-physician practitioner's note, except where noted. All available labs and Radiology studies were reviewed.  I was present for key portions of any procedure(s) performed by the resident/non-physician practitioner and I was immediately available to provide assistance.       At this point I agree with the current assessment done in the Emergency Department.  I have conducted an independent evaluation of this patient a history and physical is as follows:    OA: 44 y/o f who presents with 3-4 days of fevers with Tmax of 101.9, generalized fatigue and malaise. + home COVID swab. Mild loose, nonbloody stools. Yesterday noted, nonproductive cough and SOB that has continued today. Called her PCP today but no appointments available so sent to the ED for further evaluation.  Patient otherwise denies any chest pain or pressure.  No nausea no vomiting.  Tolerating p.o. but slightly less given slightly less appetite due to symptoms.  Patient did have surgery approximately 1 month ago on carpal tunnel.  Denies any history of early heart disease or bleeding clotting disorders.  On exam patient is nontoxic.  She does look mildly uncomfortable.  HEENT is normocephalic and atraumatic moist mucous membranes.  Mildly erythematous posterior oropharynx without exudate or edema.  Neck is supple full range of motion no lymphadenopathy no meningismus.  No stridor.  Heart is regular rate to mildly tachycardic.  Lungs with scant expiratory wheeze bilaterally.  No rhonchi no rales.  No tachypnea.  No accessory muscle use.  Abdomen is soft positive bowel sounds nontender.  No edema no calf tenderness to palpation.  Intact distal pulses.  Capillary fill less than 2 seconds.  Awake  alert oriented and appropriate.  Assessment and plan shortness of breath cough.  Concern for viral syndrome.  She notes that symptoms acutely worsened given recent surgery consideration of D-dimer.  EKG.  Chest x-ray.  Consideration of additional imaging pending lab work.  Will give breathing treatment.  Will give IV fluid hydration.  Will reevaluate.    ED Course     Patient feeling improved at this time.  On repeat exam ambulatory pulse ox 98 to 100%.  Respiratory rate 18.  No tachypnea no accessory muscle use.  Speaks in complete sentences.  Lungs are clear to auscultation bilaterally.      Patient asymptomatic.  Patient would like to go home.  Discussed blood work as well as imaging.  CBC likely secondary to viral syndrome recommend outpatient follow-up with PCP for potential repeat/ reevaluation.  Patient feels comfortable with this plan.  Understand strict follow-up and return precautions including but not limited to worsening symptoms of shortness of breath chest pain cough or other concerns.    Critical Care Time  Procedures

## 2023-12-20 NOTE — TELEPHONE ENCOUNTER
"Reason for Disposition  • MODERATE difficulty breathing (e.g., speaks in phrases, SOB even at rest, pulse 100-120)    Answer Assessment - Initial Assessment Questions  1. COVID-19 DIAGNOSIS: \"Who made your COVID-19 diagnosis?\" \"Was it confirmed by a positive lab test or self-test?\" If not diagnosed by a doctor (or NP/PA), ask \"Are there lots of cases (community spread) where you live?\" Note: See public health department website, if unsure.      Home test  2. COVID-19 EXPOSURE: \"Was there any known exposure to COVID before the symptoms began?\" [facility] Definition of close contact: within 6 feet (2 meters) for a total of 15 minutes or more over a 24-hour period.       unknown  3. ONSET: \"When did the COVID-19 symptoms start?\"       Sunday  4. WORST SYMPTOM: \"What is your worst symptom?\" (e.g., cough, fever, shortness of breath, muscle aches)      SOB on exertion   5. COUGH: \"Do you have a cough?\" If Yes, ask: \"How bad is the cough?\"        Cough dry  6. FEVER: \"Do you have a fever?\" If Yes, ask: \"What is your temperature, how was it measured, and when did it start?\"      101.9  7. RESPIRATORY STATUS: \"Describe your breathing?\" (e.g., shortness of breath, wheezing, unable to speak)       Expiratory wheeze   8. BETTER-SAME-WORSE: \"Are you getting better, staying the same or getting worse compared to yesterday?\"  If getting worse, ask, \"In what way?\"      Getting worse  9. HIGH RISK DISEASE: \"Do you have any chronic medical problems?\" (e.g., asthma, heart or lung disease, weak immune system, obesity, etc.)      denies  10. VACCINE: \"Have you had the COVID-19 vaccine?\" If Yes, ask: \"Which one, how many shots, when did you get it?\"        yes  11. BOOSTER: \"Have you received your COVID-19 booster?\" If Yes, ask: \"Which one and when did you get it?\"        no    13. OTHER SYMPTOMS: \"Do you have any other symptoms?\"  (e.g., chills, fatigue, headache, loss of smell or taste, muscle pain, sore throat)        Very weak, very sleepy, " "SOB on exertion  14. O2 SATURATION MONITOR:  \"Do you use an oxygen saturation monitor (pulse oximeter) at home?\" If Yes, ask \"What is your reading (oxygen level) today?\" \"What is your usual oxygen saturation reading?\" (e.g., 95%)        Does not have one    Protocols used: Coronavirus (COVID-19) Diagnosed or Suspected-ADULT-[place]    "

## 2023-12-20 NOTE — ED PROVIDER NOTES
History  Chief Complaint   Patient presents with    Shortness of Breath     +covid on Monday, started with wheezing yesterday morning.      HPI  Patient is 43-year-old female with past medical history of anxiety and depression presenting to ED for viral symptoms.  Patient reports 3 to 4 days of fever max temp 101.9, generalized fatigue and malaise and diarrhea.  Patient reports positive home COVID test .  Nonproductive cough and congestion and shortness of breath started yesterday and continued today.  Patient reports called PCP and told to come to the ED for further evaluation.  Patient reports tolerating p.o. intake, but eating less due to left appetite.  Patient denies chest pain, N/V.  Patient had orthopedic surgery 1 month ago.  Patient denies OCP.  Patient denies family history of heart disease in family.  Prior to Admission Medications   Prescriptions Last Dose Informant Patient Reported? Taking?   buPROPion (WELLBUTRIN XL) 300 mg 24 hr tablet   No No   Sig: TAKE 1 TABLET BY MOUTH EVERY MORNING   fluticasone (FLONASE) 50 mcg/act nasal spray   No No   Si sprays into each nostril daily   Patient not taking: Reported on 2023   methocarbamol (ROBAXIN) 500 mg tablet   Yes No   Sig: Take 1,000 mg by mouth 4 (four) times a day as needed for muscle spasms   Patient not taking: Reported on 2023   oxyCODONE-acetaminophen (Percocet) 5-325 mg per tablet   No No   Sig: Take 1 tablet by mouth every 12 (twelve) hours as needed for severe pain Max Daily Amount: 2 tablets   Patient not taking: Reported on 2023      Facility-Administered Medications: None       Past Medical History:   Diagnosis Date    Abnormal Pap smear of cervix     Anxiety     Cervical cancer (HCC)     Depression     HPV (human papilloma virus) infection     Moderate dysplasia of cervix ([place] II)     Last assessed 2016       Past Surgical History:   Procedure Laterality Date    CERVICAL BIOPSY  W/ LOOP ELECTRODE EXCISION       FOOT SURGERY Right     MO NEUROPLASTY &/TRANSPOSITION ULNAR NERVE ELBOW Right 11/10/2023    Procedure: RELEASE CUBITAL TUNNEL;  Surgeon: Dean Salazar MD;  Location: [facility];  Service: Orthopedics    TONSILLECTOMY AND ADENOIDECTOMY      TUBAL LIGATION         Family History   Problem Relation Age of Onset    Alcohol abuse Mother     Diabetes Father     No Known Problems Sister     No Known Problems Daughter     No Known Problems Daughter     Diabetes Maternal Grandmother     Ovarian cancer Maternal Grandmother     Cancer Maternal Grandmother     Diabetes Paternal Grandmother     No Known Problems Brother     No Known Problems Son     Diabetes Maternal Aunt     No Known Problems Maternal Aunt     No Known Problems Maternal Aunt     No Known Problems Maternal Aunt     No Known Problems Maternal Aunt     No Known Problems Maternal Aunt     No Known Problems Maternal Aunt     Breast cancer additional onset Paternal Aunt     Cancer Paternal Aunt      I have reviewed and agree with the history as documented.    E-Cigarette/Vaping    E-Cigarette Use Never User      E-Cigarette/Vaping Substances    Nicotine No     THC No     CBD No     Flavoring No     Other No     Unknown No      Social History     Tobacco Use    Smoking status: Former     Current packs/day: 0.00     Types: Cigarettes     Quit date: 2002     Years since quittin.9     Passive exposure: Never    Smokeless tobacco: Never    Tobacco comments:     1 pk weekly   Vaping Use    Vaping status: Never Used   Substance Use Topics    Alcohol use: No    Drug use: No        Review of Systems   Constitutional:  Positive for appetite change, fatigue and fever. Negative for chills.   HENT:  Positive for congestion. Negative for sore throat.    Respiratory:  Positive for cough and shortness of breath.    Cardiovascular:  Negative for chest pain and leg swelling.   Gastrointestinal:  Positive for diarrhea. Negative for abdominal pain, nausea and vomiting.    Genitourinary:  Negative for dysuria and hematuria.   Skin:  Negative for rash.   Neurological:  Negative for dizziness and headaches.       Physical Exam  ED Triage Vitals   Temperature Pulse Respirations Blood Pressure SpO2   12/20/23 1100 12/20/23 1100 12/20/23 1100 12/20/23 1100 12/20/23 1100   98.6 °F (37 °C) 91 18 135/88 100 %      Temp Source Heart Rate Source Patient Position - Orthostatic VS BP Location FiO2 (%)   12/20/23 1100 12/20/23 1100 12/20/23 1230 12/20/23 1100 --   Tympanic Monitor Lying Right arm       Pain Score       12/20/23 1100       No Pain             Orthostatic Vital Signs  Vitals:    12/20/23 1100 12/20/23 1230 12/20/23 1422   BP: 135/88 109/76    Pulse: 91 78 99   Patient Position - Orthostatic VS:  Lying        Physical Exam  Vitals reviewed.   Constitutional:       General: She is awake.      Comments: Patient appears uncomfortable   HENT:      Head: Normocephalic and atraumatic.      Mouth/Throat:      Mouth: Mucous membranes are moist.   Eyes:      Extraocular Movements: Extraocular movements intact.      Right eye: No nystagmus.      Left eye: No nystagmus.      Conjunctiva/sclera: Conjunctivae normal.      Pupils: Pupils are equal, round, and reactive to light.   Cardiovascular:      Rate and Rhythm: Normal rate and regular rhythm.      Pulses: Normal pulses.      Heart sounds: Normal heart sounds, S1 normal and S2 normal. Heart sounds not distant. No murmur heard.     No friction rub. No gallop.   Pulmonary:      Effort: Tachypnea present.      Breath sounds: No stridor. No wheezing, rhonchi or rales.      Comments: Intermittent transmitted upper airway sounds.  Abdominal:      General: Bowel sounds are normal.      Palpations: Abdomen is soft.      Tenderness: There is no abdominal tenderness.   Musculoskeletal:      Right lower leg: No edema.      Left lower leg: No edema.   Skin:     General: Skin is warm and dry.      Capillary Refill: Capillary refill takes less than 2  seconds.   Neurological:      Mental Status: She is alert and oriented to person, place, and time.      GCS: GCS eye subscore is 4. GCS verbal subscore is 5. GCS motor subscore is 6.      Cranial Nerves: Cranial nerves 2-12 are intact.      Sensory: Sensation is intact.         ED Medications  Medications   albuterol inhalation solution 5 mg (5 mg Nebulization Given 12/20/23 1235)   ipratropium (ATROVENT) 0.02 % inhalation solution 0.5 mg (0.5 mg Nebulization Given 12/20/23 1235)   sodium chloride 0.9 % bolus 1,000 mL (0 mL Intravenous Stopped 12/20/23 1439)   potassium chloride (K-DUR,KLOR-CON) CR tablet 40 mEq (40 mEq Oral Given 12/20/23 1410)   albuterol (PROVENTIL HFA,VENTOLIN HFA) inhaler 2 puff (2 puffs Inhalation Given 12/20/23 1442)       Diagnostic Studies  Results Reviewed       Procedure Component Value Units Date/Time    HS Troponin 0hr (reflex protocol) [[id number]]  (Normal) Collected: 12/20/23 1251    Lab Status: Final result Specimen: Blood from Arm, Right Updated: 12/20/23 1330     hs TnI 0hr <2 ng/L     FLU/RSV/COVID - if FLU/RSV clinically relevant [[id number]]  (Abnormal) Collected: 12/20/23 1234    Lab Status: Final result Specimen: Nares from Nose Updated: 12/20/23 1325     SARS-CoV-2 Negative     INFLUENZA A PCR Positive     INFLUENZA B PCR Negative     RSV PCR Negative    Narrative:      FOR PEDIATRIC PATIENTS - copy/paste COVID Guidelines URL to browser: [email or web address]    SARS-CoV-2 assay is a Nucleic Acid Amplification assay intended for the  qualitative detection of nucleic acid from SARS-CoV-2 in nasopharyngeal  swabs. Results are for the presumptive identification of SARS-CoV-2 RNA.    Positive results are indicative of infection with SARS-CoV-2, the virus  causing COVID-19, but do not rule out bacterial infection or co-infection  with other viruses. Laboratories within the [place] and its  territories are required to report  all positive results to the appropriate  public health authorities. Negative results do not preclude SARS-CoV-2  infection and should not be used as the sole basis for treatment or other  patient management decisions. Negative results must be combined with  clinical observations, patient history, and epidemiological information.  This test has not been FDA cleared or approved.    This test has been authorized by FDA under an Emergency Use Authorization  (EUA). This test is only authorized for the duration of time the  declaration that circumstances exist justifying the authorization of the  emergency use of an in vitro diagnostic tests for detection of SARS-CoV-2  virus and/or diagnosis of COVID-19 infection under section 564(b)(1) of  the Act, 21 U.S.C. 360bbb-3(b)(1), unless the authorization is terminated  or revoked sooner. The test has been validated but independent review by FDA  and CLIA is pending.    Test performed using [employer] GeneXpert: This RT-PCR assay targets N2,  a region unique to SARS-CoV-2. A conserved region in the E-gene was chosen  for pan-Sarbecovirus detection which includes SARS-CoV-2.    According to CMS-2020-01-R, this platform meets the definition of high-throughput technology.    Comprehensive metabolic panel [[id number]]  (Abnormal) Collected: 12/20/23 1251    Lab Status: Final result Specimen: Blood from Arm, Right Updated: 12/20/23 1322     Sodium 138 mmol/L      Potassium 3.3 mmol/L      Chloride 105 mmol/L      CO2 22 mmol/L      ANION GAP 11 mmol/L      BUN 10 mg/dL      Creatinine 0.85 mg/dL      Glucose 87 mg/dL      Calcium 9.0 mg/dL      AST 21 U/L      ALT 19 U/L      Alkaline Phosphatase 69 U/L      Total Protein 7.3 g/dL      Albumin 4.3 g/dL      Total Bilirubin 0.66 mg/dL      eGFR 85 ml/min/1.73sq m     Narrative:      National Kidney Disease Foundation guidelines for Chronic Kidney Disease (CKD):     Stage 1 with normal or high GFR (GFR > 90 mL/min/1.73 square meters)     Stage 2 Mild CKD (GFR = 60-89 mL/min/1.73 square meters)    Stage 3A Moderate CKD (GFR = 45-59 mL/min/1.73 square meters)    Stage 3B Moderate CKD (GFR = 30-44 mL/min/1.73 square meters)    Stage 4 Severe CKD (GFR = 15-29 mL/min/1.73 square meters)    Stage 5 End Stage CKD (GFR <15 mL/min/1.73 square meters)  Note: GFR calculation is accurate only with a steady state creatinine    D-dimer, quantitative [[id number]]  (Normal) Collected: 12/20/23 1251    Lab Status: Final result Specimen: Blood from Arm, Right Updated: 12/20/23 1317     D-Dimer, Quant <0.27 ug/ml FEU     CBC and differential [[id number]]  (Abnormal) Collected: 12/20/23 1251    Lab Status: Final result Specimen: Blood from Arm, Right Updated: 12/20/23 1305     WBC 4.00 Thousand/uL      RBC 4.62 Million/uL      Hemoglobin 13.8 g/dL      Hematocrit 41.6 %      MCV 90 fL      MCH 29.9 pg      MCHC 33.2 g/dL      RDW 12.2 %      MPV 11.0 fL      Platelets 232 Thousands/uL      nRBC 0 /100 WBCs      Neutrophils Relative 43 %      Immat GRANS % 0 %      Lymphocytes Relative 40 %      Monocytes Relative 14 %      Eosinophils Relative 2 %      Basophils Relative 1 %      Neutrophils Absolute 1.73 Thousands/µL      Immature Grans Absolute 0.01 Thousand/uL      Lymphocytes Absolute 1.58 Thousands/µL      Monocytes Absolute 0.57 Thousand/µL      Eosinophils Absolute 0.08 Thousand/µL      Basophils Absolute 0.03 Thousands/µL                    XR chest 2 views   Final Result by Leigh Ann Padron MD (12/20 1337)      No acute consolidation or congestion.                  Resident: DOUG JARRELL I, the attending radiologist, have reviewed the images and agree with the final report above.      Workstation performed: [id number]               Procedures  Procedures      ED Course  ED Course as of 12/20/23 2325   Wed Dec 20, 2023   1114 Procedure Note: EKG  Date/Time: 12/20/23 11:14 AM   Interpreted by: WILL TORRES  Indications / Diagnosis: SOB  ECG reviewed  by me, the ED Provider: yes   The EKG demonstrates:  Rhythm: normal sinus  Intervals: normal intervals  Axis: normal axis  QRS/Blocks: normal QRS  ST Changes: No acute ST Changes, no STD/PRIYA.     1316 WBC(!): 4.00   1318 D-Dimer, Quant: <0.27   1318 On re-evaluation patient reports breathing feels easier after nebulizer treatment.    1326 INFLU A PCR(!): Positive   1340 hs TnI 0hr: <2   1340 XR chest 2 views  IMPRESSION:     No acute consolidation or congestion.     1355 Potassium(!): 3.3  Will replete    1406 Discussed positive Influenza A. Patient feeling slightly better. Will P.O. challenge and ambulatory pulse ox and reassess.                                        Medical Decision Making  Amount and/or Complexity of Data Reviewed  Labs: ordered. Decision-making details documented in ED Course.  Radiology: ordered. Decision-making details documented in ED Course.    Risk  Prescription drug management.        Patient is 43 y.o. female with PMH of anxiety and depression who presents to the ED with viral symptoms.    Vital signs mild tachypnea, otherwise WNL. On exam patient appears mildly uncomfortable.  Head atraumatic, normocephalic.  PERRL, EOMI, conjunctival clear.  Mucous membranes moist.  No posterior oropharynx erythema or edema, no tonsillar exudates, uvula midline.  Heart RRR, no murmurs, rubs, gallops.  Intermittent transmitted upper airway sounds bilaterally, no rales, wheezing, rhonchi.  Abdomen soft and nontender.  No leg edema or redness.    Differential diagnosis included but not limited to viral syndrome, PE, arrhyhtmia, anemia, electrolyte disturbance. Plan for labs including troponin, d-dimer. EKG, viral swab. Will treat symptomatically with duoneb and fluids.     View ED course above for further discussion on patient workup.       All labs reviewed and utilized in the medical decision making process  All radiology studies independently viewed by me and interpreted by the radiologist.  I reviewed  all testing with the patient.     Upon re-evaluation patient feeling slightly better. Ambulatory pulse ox %, tachypnea resolved. Patient requesting to go home.     I have reviewed the patient's vital signs, nursing notes, and other relevant tests/information. I had a detailed discussion with the patient regarding the history, exam findings, and any diagnostic results.   Plan to discharge home in stable condition with Influenza, follow up with PCP.  Discussed with patient who is agreeable to plan.  I discussed discharge instructions, need for follow-up, and oral return precautions for what to return for in addition to the written return precautions and discharge instructions, specifically highlighting areas of special concern.  The patient verbalized understanding of the discharge instructions and warnings that would necessitate return to the Emergency Department including severe shortness of breath, fevers, inability to tolerate drinking water/other fluids or other concerning symptoms. .  All questions the patient had were answered prior to discharge to the best of my ability.       Disposition  Final diagnoses:   Influenza A     Time reflects when diagnosis was documented in both MDM as applicable and the Disposition within this note       Time User Action Codes Description Comment    12/20/2023  2:32 PM Sonya Vo Add [J10.1] Influenza A           ED Disposition       ED Disposition   Discharge    Condition   Stable    Date/Time   Wed Dec 20, 2023  2:31 PM    Comment   Flavia Laura discharge to home/self care.                   Follow-up Information       Follow up With Specialties Details Why Contact Info Additional Information    Velma Chatterjee MD Family Medicine   [address] [place] [address] [phone]       [facility] Emergency Department Emergency Medicine Go to  If symptoms worsen [address] [place] [id number] [phone] [facility]  [facility] Emergency Department, [address], [place], [address]   [phone]            Discharge Medication List as of 12/20/2023  2:35 PM        CONTINUE these medications which have NOT CHANGED    Details   buPROPion (WELLBUTRIN XL) 300 mg 24 hr tablet TAKE 1 TABLET BY MOUTH EVERY MORNING, Starting Mon 10/9/2023, Normal      fluticasone (FLONASE) 50 mcg/act nasal spray 2 sprays into each nostril daily, Starting Wed 10/4/2023, Normal      methocarbamol (ROBAXIN) 500 mg tablet Take 1,000 mg by mouth 4 (four) times a day as needed for muscle spasms, Starting Fri 8/4/2023, Historical Med      oxyCODONE-acetaminophen (Percocet) 5-325 mg per tablet Take 1 tablet by mouth every 12 (twelve) hours as needed for severe pain Max Daily Amount: 2 tablets, Starting Fri 11/10/2023, Normal           No discharge procedures on file.    PDMP Review       None             ED Provider  Attending physically available and evaluated Flavia Laura. I managed the patient along with the ED Attending.    Electronically Signed by           Sonya Vo DO  12/20/23 [id number]

## 2023-12-20 NOTE — TELEPHONE ENCOUNTER
Regarding: covid positive/breathing  ----- Message from Nori Romero sent at 12/20/2023  9:42 AM EST -----  Flavia is calling stating she tested pos for covid on Monday.  She started feeling sick Sunday.  She is feeling worse.  Weak, cough, fatigue and walking she gets out of breath. No apt available.  She would like some advice on what she can do or take  Thank you

## 2023-12-20 NOTE — DISCHARGE INSTRUCTIONS
You were seen in the ED for cough/congestion. You have influenza A.     You have a slightly decreased WBC on CBC this is likely from your viral illness, follow up with PCP to have repeated after symptoms resolve.     Please return to the ED if you have severe shortness of breath, fevers, inability to tolerate drinking water/other fluids or other concerning symptoms.

## 2023-12-20 NOTE — Clinical Note
Flavia Laura was seen and treated in our emergency department on 12/20/2023.    No restrictions            Diagnosis:     Flavia  .    She may return on this date: 12/26/2023         If you have any questions or concerns, please don't hesitate to call.      Sonya Vo, DO    ______________________________           _______________          _______________  Hospital Representative                              Date                                Time

## 2024-01-08 ENCOUNTER — HOSPITAL ENCOUNTER (OUTPATIENT)
Dept: RADIOLOGY | Age: 44
Discharge: HOME | End: 2024-01-08
Payer: COMMERCIAL

## 2024-01-08 DIAGNOSIS — R91.1 PULMONARY NODULE: ICD-10-CM

## 2024-01-08 PROCEDURE — G1004 CDSM NDSC: HCPCS

## 2024-01-08 PROCEDURE — 71250 CT THORAX DX C-: CPT

## 2024-01-10 ENCOUNTER — NURSE TRIAGE (OUTPATIENT)
Age: 44
End: 2024-01-10

## 2024-01-10 NOTE — TELEPHONE ENCOUNTER
"Reason for Disposition  • Problems with anxiety or stress    Answer Assessment - Initial Assessment Questions  1. DESCRIPTION: \"Please describe your heart rate or heartbeat that you are having\" (e.g., fast/slow, regular/irregular, skipped or extra beats, \"palpitations\")      Fluttering feeling   2. ONSET: \"When did it start?\" (Minutes, hours or days)       One week   3. DURATION: \"How long does it last\" (e.g., seconds, minutes, hours)      Fleeting   4. PATTERN \"Does it come and go, or has it been constant since it started?\"  \"Does it get worse with exertion?\"   \"Are you feeling it now?\"      intermittent  5. TAP: \"Using your hand, can you tap out what you are feeling on a chair or table in front of you, so that I can hear?\" (Note: not all patients can do this)        N/a  6. HEART RATE: \"Can you tell me your heart rate?\" \"How many beats in 15 seconds?\"  (Note: not all patients can do this)        Does not have a pulse monitor at home  7. RECURRENT SYMPTOM: \"Have you ever had this before?\" If Yes, ask: \"When was the last time?\" and \"What happened that time?\"       Has has in the past , very seldom  8. CAUSE: \"What do you think is causing the palpitations?\"      Stress ? Recent Ct scan .wating for results   9. CARDIAC HISTORY: \"Do you have any history of heart disease?\" (e.g., heart attack, angina, bypass surgery, angioplasty, arrhythmia)       denies  10. OTHER SYMPTOMS: \"Do you have any other symptoms?\" (e.g., dizziness, chest pain, sweating, difficulty breathing)        Pending ct scan - for f/o pulm. Nodule   11. PREGNANCY: \"Is there any chance you are pregnant?\" \"When was your last menstrual period?\"        denies    Protocols used: Heart Rate and Heartbeat Questions-ADULT-[place]    "

## 2024-01-10 NOTE — TELEPHONE ENCOUNTER
Patient scheduled for tomorrow. Would like to know if the ct scan report can be made available to Dr Chatterjee to review at her appointment . Please call the reading to have it read if appropriate .

## 2024-01-10 NOTE — TELEPHONE ENCOUNTER
"Regarding: pounding in chest  ----- Message from Jie Hyde sent at 1/10/2024 12:42 PM EST -----  Pt said she has had pounding in her chest for a week. She said it is a weird feeling. No sharp pain but takes her breath away. She explains it as \"bubbles\". Pt had CT scan on Monday on her chest. No contrast. Tried to warm triage but no answer. Please, triage.    "

## 2024-01-11 ENCOUNTER — OFFICE VISIT (OUTPATIENT)
Dept: FAMILY MEDICINE CLINIC | Facility: CLINIC | Age: 44
End: 2024-01-11
Payer: COMMERCIAL

## 2024-01-11 VITALS
TEMPERATURE: 98.4 F | BODY MASS INDEX: 34.99 KG/M2 | OXYGEN SATURATION: 100 % | HEIGHT: 65 IN | SYSTOLIC BLOOD PRESSURE: 120 MMHG | WEIGHT: 210 LBS | HEART RATE: 84 BPM | DIASTOLIC BLOOD PRESSURE: 70 MMHG | RESPIRATION RATE: 18 BRPM

## 2024-01-11 DIAGNOSIS — G43.009 MIGRAINE WITHOUT AURA AND WITHOUT STATUS MIGRAINOSUS, NOT INTRACTABLE: ICD-10-CM

## 2024-01-11 DIAGNOSIS — R91.1 PULMONARY NODULE: ICD-10-CM

## 2024-01-11 DIAGNOSIS — F41.9 ANXIETY: ICD-10-CM

## 2024-01-11 DIAGNOSIS — E66.01 CLASS 2 SEVERE OBESITY DUE TO EXCESS CALORIES WITH SERIOUS COMORBIDITY AND BODY MASS INDEX (BMI) OF 35.0 TO 35.9 IN ADULT: ICD-10-CM

## 2024-01-11 DIAGNOSIS — F32.0 CURRENT MILD EPISODE OF MAJOR DEPRESSIVE DISORDER WITHOUT PRIOR EPISODE (HCC): ICD-10-CM

## 2024-01-11 DIAGNOSIS — R00.2 PALPITATIONS: Primary | ICD-10-CM

## 2024-01-11 PROCEDURE — 99214 OFFICE O/P EST MOD 30 MIN: CPT | Performed by: FAMILY MEDICINE

## 2024-01-11 PROCEDURE — 93000 ELECTROCARDIOGRAM COMPLETE: CPT | Performed by: FAMILY MEDICINE

## 2024-01-11 RX ORDER — BUPROPION HYDROCHLORIDE 300 MG/1
300 TABLET ORAL EVERY MORNING
Qty: 90 TABLET | Refills: 0 | Status: SHIPPED | OUTPATIENT
Start: 2024-01-11

## 2024-01-11 NOTE — PROGRESS NOTES
Name: Flavia Laura      : 1980      MRN: [id number]  Encounter Provider: Velma Chatterjee MD  Encounter Date: 2024   Encounter department: [facility]    Assessment & Plan     1. Palpitations  Comments:  normal EKG in the office today   to cut back on anxiety   hydration  Orders:  -     POCT ECG  -     CBC and differential  -     Comprehensive metabolic panel  -     Iron Panel (Includes Ferritin, Iron Sat%, Iron, and TIBC); Future  -     TSH, 3rd generation with Free T4 reflex; Future    2. Pulmonary nodule  Assessment & Plan:  CT scan done 2024   Reading pending as of today's visit  Continue to monitor      3. Migraine without aura and without status migrainosus, not intractable  Assessment & Plan:  stable      4. Class 2 severe obesity due to excess calories with serious comorbidity and body mass index (BMI) of 35.0 to 35.9 in adult   Assessment & Plan:  Diet, exercise, portion control      5. Anxiety  Assessment & Plan:  On wellbutrin which takes the edge off     Orders:  -     buPROPion (WELLBUTRIN XL) 300 mg 24 hr tablet; Take 1 tablet (300 mg total) by mouth every morning    6. Current mild episode of major depressive disorder without prior episode (HCC)  Assessment & Plan:  Stable on wellbutrin     Orders:  -     buPROPion (WELLBUTRIN XL) 300 mg 24 hr tablet; Take 1 tablet (300 mg total) by mouth every morning           Subjective   Here for palpitations on and off for the last 7 days   Had Flu A 3 weeks ago and symptoms got better from that   She is anxious about her CT chest results which hasn't came back yet  Drinks coffee 50 oz of coffee and espresso      Palpitations  This is a new problem. The current episode started in the past 7 days. The problem occurs intermittently. The problem has been waxing and waning. Pertinent negatives include no abdominal pain, anorexia, arthralgias, change in bowel habit, chest pain, chills, congestion, coughing, diaphoresis, fatigue, fever,  headaches, joint swelling, myalgias, nausea, neck pain, numbness, rash, sore throat, swollen glands, urinary symptoms, vertigo, visual change, vomiting or weakness. Nothing aggravates the symptoms. She has tried nothing for the symptoms. The treatment provided no relief.     Review of Systems   Constitutional: Negative.  Negative for chills, diaphoresis, fatigue and fever.   HENT: Negative.  Negative for congestion and sore throat.    Respiratory:  Negative for cough.    Cardiovascular:  Positive for palpitations. Negative for chest pain.   Gastrointestinal:  Negative for abdominal pain, anorexia, change in bowel habit, nausea and vomiting.   Musculoskeletal:  Negative for arthralgias, joint swelling, myalgias and neck pain.   Skin:  Negative for rash.   Neurological:  Negative for vertigo, weakness, numbness and headaches.       Past Medical History:   Diagnosis Date   • Abnormal Pap smear of cervix    • Anxiety    • Cervical cancer (HCC)    • Depression    • HPV (human papilloma virus) infection    • Moderate dysplasia of cervix (SALBADOR II)     Last assessed 8/18/2016     Past Surgical History:   Procedure Laterality Date   • CERVICAL BIOPSY  W/ LOOP ELECTRODE EXCISION     • FOOT SURGERY Right    • NJ NEUROPLASTY &/TRANSPOSITION ULNAR NERVE ELBOW Right 11/10/2023    Procedure: RELEASE CUBITAL TUNNEL;  Surgeon: Dean Salazar MD;  Location: [facility] OR;  Service: Orthopedics   • TONSILLECTOMY AND ADENOIDECTOMY     • TUBAL LIGATION       Family History   Problem Relation Age of Onset   • Alcohol abuse Mother    • Diabetes Father    • No Known Problems Sister    • No Known Problems Daughter    • No Known Problems Daughter    • Diabetes Maternal Grandmother    • Ovarian cancer Maternal Grandmother    • Cancer Maternal Grandmother    • Diabetes Paternal Grandmother    • No Known Problems Brother    • No Known Problems Son    • Diabetes Maternal Aunt    • No Known Problems Maternal Aunt    • No Known Problems Maternal Aunt     • No Known Problems Maternal Aunt    • No Known Problems Maternal Aunt    • No Known Problems Maternal Aunt    • No Known Problems Maternal Aunt    • Breast cancer additional onset Paternal Aunt    • Cancer Paternal Aunt      Social History     Socioeconomic History   • Marital status: Single     Spouse name: None   • Number of children: None   • Years of education: None   • Highest education level: None   Occupational History     Employer: [employer]   Tobacco Use   • Smoking status: Former     Current packs/day: 0.00     Types: Cigarettes     Quit date: 2002     Years since quittin.0     Passive exposure: Never   • Smokeless tobacco: Never   • Tobacco comments:     1 pk weekly   Vaping Use   • Vaping status: Never Used   Substance and Sexual Activity   • Alcohol use: No   • Drug use: No   • Sexual activity: Yes     Partners: Male     Birth control/protection: Female Sterilization   Other Topics Concern   • None   Social History Narrative    Lack of exercise     Social Determinants of Health     Financial Resource Strain: Low Risk  (2023)    Overall Financial Resource Strain (CARDIA)    • Difficulty of Paying Living Expenses: Not very hard   Food Insecurity: No Food Insecurity (2023)    Hunger Vital Sign    • Worried About Running Out of Food in the Last Year: Never true    • Ran Out of Food in the Last Year: Never true   Transportation Needs: No Transportation Needs (2023)    PRAPARE - Transportation    • Lack of Transportation (Medical): No    • Lack of Transportation (Non-Medical): No   Physical Activity: Not on file   Stress: Not on file   Social Connections: Not on file   Intimate Partner Violence: Not At Risk (2022)    Received from [facility]    Humiliation, Afraid, Rape, and Kick questionnaire    • Fear of Current or Ex-Partner: No    • Emotionally Abused: No    • Physically Abused: No    • Sexually Abused: No   Housing Stability: Low Risk   "(12/28/2022)    Received from [facility]    Housing Stability Vital Sign    • Unable to Pay for Housing in the Last Year: No    • Number of Places Lived in the Last Year: 1    • Unstable Housing in the Last Year: No     Current Outpatient Medications on File Prior to Visit   Medication Sig   • [DISCONTINUED] buPROPion (WELLBUTRIN XL) 300 mg 24 hr tablet TAKE 1 TABLET BY MOUTH EVERY MORNING   • fluticasone (FLONASE) 50 mcg/act nasal spray 2 sprays into each nostril daily (Patient not taking: Reported on 1/11/2024)   • [DISCONTINUED] methocarbamol (ROBAXIN) 500 mg tablet Take 1,000 mg by mouth 4 (four) times a day as needed for muscle spasms (Patient not taking: Reported on 1/11/2024)   • [DISCONTINUED] oxyCODONE-acetaminophen (Percocet) 5-325 mg per tablet Take 1 tablet by mouth every 12 (twelve) hours as needed for severe pain Max Daily Amount: 2 tablets (Patient not taking: Reported on 11/21/2023)     No Known Allergies  Immunization History   Administered Date(s) Administered   • COVID-19 MODERNA VACC 0.5 ML IM 04/15/2021, 05/13/2021   • INFLUENZA 12/12/2017   • Influenza Quadrivalent Preservative Free 3 years and older IM 10/23/2014, 12/12/2017   • Tdap 08/04/2020       Objective     /70 (BP Location: Left arm, Patient Position: Sitting, Cuff Size: Standard)   Pulse 84   Temp 98.4 °F (36.9 °C) (Tympanic)   Resp 18   Ht 5' 5\" (1.651 m)   Wt 95.3 kg (210 lb)   SpO2 100%   BMI 34.95 kg/m²     Physical Exam  Vitals and nursing note reviewed.   Constitutional:       Appearance: Normal appearance.   HENT:      Right Ear: Tympanic membrane normal.      Left Ear: Tympanic membrane normal.   Cardiovascular:      Rate and Rhythm: Normal rate and regular rhythm.      Pulses: Normal pulses.      Heart sounds: Normal heart sounds.   Musculoskeletal:      Cervical back: Normal range of motion.   Neurological:      General: No focal deficit present.      Mental Status: She is alert and oriented to " person, place, and time.   Psychiatric:         Mood and Affect: Mood normal.         Behavior: Behavior normal.       Velma Chatterjee MD

## 2024-02-22 ENCOUNTER — TELEPHONE (OUTPATIENT)
Dept: PSYCHIATRY | Facility: CLINIC | Age: 44
End: 2024-02-22

## 2024-02-22 NOTE — TELEPHONE ENCOUNTER
BIPINM for Pt. To return call to  be scheduled with [facility]    Promise verified:  [facility]  [phone]

## 2024-09-18 ENCOUNTER — OFFICE VISIT (OUTPATIENT)
Dept: FAMILY MEDICINE CLINIC | Facility: CLINIC | Age: 44
End: 2024-09-18
Payer: COMMERCIAL

## 2024-09-18 ENCOUNTER — TELEPHONE (OUTPATIENT)
Age: 44
End: 2024-09-18

## 2024-09-18 VITALS
HEIGHT: 65 IN | WEIGHT: 182.6 LBS | BODY MASS INDEX: 30.42 KG/M2 | TEMPERATURE: 98.8 F | RESPIRATION RATE: 16 BRPM | HEART RATE: 93 BPM | DIASTOLIC BLOOD PRESSURE: 62 MMHG | OXYGEN SATURATION: 99 % | SYSTOLIC BLOOD PRESSURE: 104 MMHG

## 2024-09-18 DIAGNOSIS — Z00.00 ANNUAL PHYSICAL EXAM: Primary | ICD-10-CM

## 2024-09-18 DIAGNOSIS — Z12.31 ENCOUNTER FOR SCREENING MAMMOGRAM FOR BREAST CANCER: ICD-10-CM

## 2024-09-18 DIAGNOSIS — F33.42 RECURRENT MAJOR DEPRESSIVE DISORDER, IN FULL REMISSION (HCC): ICD-10-CM

## 2024-09-18 DIAGNOSIS — F41.9 ANXIETY: ICD-10-CM

## 2024-09-18 DIAGNOSIS — E78.5 DYSLIPIDEMIA: ICD-10-CM

## 2024-09-18 PROCEDURE — 99396 PREV VISIT EST AGE 40-64: CPT | Performed by: NURSE PRACTITIONER

## 2024-09-18 NOTE — ASSESSMENT & PLAN NOTE
Well controlled, continue wellbutrin.  Referred again for therapy and psychiatry.    Orders:    Ambulatory referral to Psych Services; Future

## 2024-09-18 NOTE — PROGRESS NOTES
Adult Annual Physical  Name: Flavia Laura      : 1980      MRN: [id number]  Encounter Provider: MAURICE Stubbs  Encounter Date: 2024   Encounter department: [facility]    Assessment & Plan  Annual physical exam  Unremarkable exam of healthy female.  Due to repeat labs.  Up to date seeing gyn, needs to repeat mammogram, order provided.  Encourage healthy diet, regular exercise.  Due for eye exam, dental exam.       Dyslipidemia  Encourage healthy diet, added lipid panel to existing orders.  Orders:    Lipid panel; Future    Anxiety  Stable with current management., refer to psychiatry and for counseling.  Was previously scheduled and lost insurance before she was able to be seen.  New referral provided.  Orders:    Ambulatory referral to Psych Services; Future    Recurrent major depressive disorder, in full remission (HCC)  Well controlled, continue wellbutrin.  Referred again for therapy and psychiatry.    Orders:    Ambulatory referral to Psych Services; Future    Immunizations and preventive care screenings were discussed with patient today. Appropriate education was printed on patient's after visit summary.    Counseling:  Dental Health: discussed importance of regular tooth brushing, flossing, and dental visits.  Injury prevention: discussed safety/seat belts, safety helmets, smoke detectors, carbon dioxide detectors, and smoking near bedding or upholstery.  Exercise: the importance of regular exercise/physical activity was discussed. Recommend exercise 3-5 times per week for at least 30 minutes.          History of Present Illness     Adult Annual Physical:  Patient presents for annual physical.     Diet and Physical Activity:  - Diet/Nutrition: well balanced diet.  - Exercise: no formal exercise. walks a lot at work    Depression Screening:    - PHQ-9 Score: 3    General Health:  - Sleep: 4-6 hours of sleep on average and sleeps well. baseline  - Hearing: normal  "hearing bilateral ears.  - Vision: most recent eye exam > 1 year ago and vision problems. when reading, watching tv, blurry  - Dental: regular dental visits and brushes teeth twice daily. flosses daily    /GYN Health:  - Follows with GYN: yes.   - Menopause: premenopausal.   - Last menstrual cycle: 8/18/2024.     Review of Systems   Constitutional:  Negative for activity change, appetite change, chills, fatigue, fever and unexpected weight change.   HENT:  Negative for hearing loss.    Eyes:  Positive for visual disturbance (near vision blurry).   Respiratory:  Negative for cough, chest tightness and shortness of breath.    Cardiovascular:  Negative for chest pain, palpitations and leg swelling.   Gastrointestinal:  Negative for constipation, diarrhea, nausea and vomiting.   Genitourinary:  Positive for menstrual problem (irregular). Negative for dysuria and frequency.   Musculoskeletal:  Positive for arthralgias (Right Upper Extremity). Negative for myalgias.   Allergic/Immunologic: Negative for environmental allergies.   Neurological:  Positive for numbness (Right Hand, hx cubital tunnel). Negative for dizziness, weakness, light-headedness and headaches.   Psychiatric/Behavioral:  Negative for dysphoric mood and sleep disturbance. The patient is nervous/anxious (baseline).          Objective     /62 (BP Location: Left arm, Patient Position: Sitting, Cuff Size: Standard)   Pulse 93   Temp 98.8 °F (37.1 °C) (Tympanic)   Resp 16   Ht 5' 5\" (1.651 m)   Wt 82.8 kg (182 lb 9.6 oz)   SpO2 99%   BMI 30.39 kg/m²     Physical Exam  Vitals reviewed.   Constitutional:       General: She is awake. She is not in acute distress.     Appearance: Normal appearance. She is well-developed and well-groomed. She is not ill-appearing.   HENT:      Head: Normocephalic.      Right Ear: Hearing, tympanic membrane, ear canal and external ear normal.      Left Ear: Hearing, tympanic membrane, ear canal and external ear " normal.      Nose: Nose normal.      Mouth/Throat:      Lips: Pink.      Pharynx: Oropharynx is clear. Uvula midline.   Eyes:      General: Lids are normal.      Conjunctiva/sclera: Conjunctivae normal.      Pupils: Pupils are equal, round, and reactive to light.   Neck:      Thyroid: No thyroid mass or thyromegaly.      Vascular: Normal carotid pulses. No carotid bruit or JVD.   Cardiovascular:      Rate and Rhythm: Normal rate and regular rhythm.      Pulses: Normal pulses.      Heart sounds: Normal heart sounds, S1 normal and S2 normal. No murmur heard.  Pulmonary:      Effort: Pulmonary effort is normal.      Breath sounds: Normal breath sounds.   Abdominal:      General: Abdomen is flat. Bowel sounds are normal.      Palpations: Abdomen is soft.      Tenderness: There is no abdominal tenderness.   Musculoskeletal:         General: Normal range of motion.      Cervical back: Full passive range of motion without pain.      Right lower leg: No edema.      Left lower leg: No edema.   Lymphadenopathy:      Head:      Right side of head: No submental, submandibular, tonsillar, preauricular, posterior auricular or occipital adenopathy.      Left side of head: No submental, submandibular, tonsillar, preauricular, posterior auricular or occipital adenopathy.      Cervical: No cervical adenopathy.   Skin:     General: Skin is warm and dry.   Neurological:      General: No focal deficit present.      Mental Status: She is alert and oriented to person, place, and time.      Sensory: No sensory deficit.      Motor: Motor function is intact.   Psychiatric:         Attention and Perception: Attention normal.         Mood and Affect: Mood normal.         Speech: Speech normal.         Behavior: Behavior normal. Behavior is cooperative.         Thought Content: Thought content normal.         Cognition and Memory: Cognition normal.         Judgment: Judgment normal.

## 2024-09-18 NOTE — PATIENT INSTRUCTIONS
"Patient Education     Routine physical for adults   The Basics   Written by the doctors and editors at [facility]   What is a physical? -- A physical is a routine visit, or \"check-up,\" with your doctor. You might also hear it called a \"wellness visit\" or \"preventive visit.\"  During each visit, the doctor will:   Ask about your physical and mental health   Ask about your habits, behaviors, and lifestyle   Do an exam   Give you vaccines if needed   Talk to you about any medicines you take   Give advice about your health   Answer your questions  Getting regular check-ups is an important part of taking care of your health. It can help your doctor find and treat any problems you have. But it's also important for preventing health problems.  A routine physical is different from a \"sick visit.\" A sick visit is when you see a doctor because of a health concern or problem. Since physicals are scheduled ahead of time, you can think about what you want to ask the doctor.  How often should I get a physical? -- It depends on your age and health. In general, for people age 21 years and older:   If you are younger than 50 years, you might be able to get a physical every 3 years.   If you are 50 years or older, your doctor might recommend a physical every year.  If you have an ongoing health condition, like diabetes or high blood pressure, your doctor will probably want to see you more often.  What happens during a physical? -- In general, each visit will include:   Physical exam - The doctor or nurse will check your height, weight, heart rate, and blood pressure. They will also look at your eyes and ears. They will ask about how you are feeling and whether you have any symptoms that bother you.   Medicines - It's a good idea to bring a list of all the medicines you take to each doctor visit. Your doctor will talk to you about your medicines and answer any questions. Tell them if you are having any side effects that bother you. You " "should also tell them if you are having trouble paying for any of your medicines.   Habits and behaviors - This includes:   Your diet   Your exercise habits   Whether you smoke, drink alcohol, or use drugs   Whether you are sexually active   Whether you feel safe at home  Your doctor will talk to you about things you can do to improve your health and lower your risk of health problems. They will also offer help and support. For example, if you want to quit smoking, they can give you advice and might prescribe medicines. If you want to improve your diet or get more physical activity, they can help you with this, too.   Lab tests, if needed - The tests you get will depend on your age and situation. For example, your doctor might want to check your:   Cholesterol   Blood sugar   Iron level   Vaccines - The recommended vaccines will depend on your age, health, and what vaccines you already had. Vaccines are very important because they can prevent certain serious or deadly infections.   Discussion of screening - \"Screening\" means checking for diseases or other health problems before they cause symptoms. Your doctor can recommend screening based on your age, risk, and preferences. This might include tests to check for:   Cancer, such as breast, prostate, cervical, ovarian, colorectal, prostate, lung, or skin cancer   Sexually transmitted infections, such as chlamydia and gonorrhea   Mental health conditions like depression and anxiety  Your doctor will talk to you about the different types of screening tests. They can help you decide which screenings to have. They can also explain what the results might mean.   Answering questions - The physical is a good time to ask the doctor or nurse questions about your health. If needed, they can refer you to other doctors or specialists, too.  Adults older than 65 years often need other care, too. As you get older, your doctor will talk to you about:   How to prevent falling at " home   Hearing or vision tests   Memory testing   How to take your medicines safely   Making sure that you have the help and support you need at home  All topics are updated as new evidence becomes available and our peer review process is complete.  This topic retrieved from [employer] on: May 02, 2024.  Topic 813768 Version 1.0  Release: 32.4.3 - C32.122  © 2024 UpToDate, Inc. and/or its affiliates. All rights reserved.  Consumer Information Use and Disclaimer   Disclaimer: This generalized information is a limited summary of diagnosis, treatment, and/or medication information. It is not meant to be comprehensive and should be used as a tool to help the user understand and/or assess potential diagnostic and treatment options. It does NOT include all information about conditions, treatments, medications, side effects, or risks that may apply to a specific patient. It is not intended to be medical advice or a substitute for the medical advice, diagnosis, or treatment of a health care provider based on the health care provider's examination and assessment of a patient's specific and unique circumstances. Patients must speak with a health care provider for complete information about their health, medical questions, and treatment options, including any risks or benefits regarding use of medications. This information does not endorse any treatments or medications as safe, effective, or approved for treating a specific patient. UpToDate, Inc. and its affiliates disclaim any warranty or liability relating to this information or the use thereof.The use of this information is governed by the Terms of Use, available at https://www.[employer]ters[employer]limmer[employer] Networksuwer.com/en/know/clinical-effectiveness-terms. 2024© UpToDate, Inc. and its affiliates and/or licensors. All rights reserved.  Copyright   © 2024 UpToDate, Inc. and/or its affiliates. All rights reserved.

## 2024-09-18 NOTE — ASSESSMENT & PLAN NOTE
Stable with current management., refer to psychiatry and for counseling.  Was previously scheduled and lost insurance before she was able to be seen.  New referral provided.  Orders:    Ambulatory referral to Psych Services; Future

## 2024-09-23 NOTE — TELEPHONE ENCOUNTER
Was calling pt in regards to routine referral and adding to proper wait list. LVM for pt to contact intake dept.     Second attempt

## 2024-10-02 ENCOUNTER — OFFICE VISIT (OUTPATIENT)
Dept: OBGYN CLINIC | Facility: CLINIC | Age: 44
End: 2024-10-02
Payer: COMMERCIAL

## 2024-10-02 VITALS
HEIGHT: 65 IN | BODY MASS INDEX: 30.32 KG/M2 | DIASTOLIC BLOOD PRESSURE: 70 MMHG | WEIGHT: 182 LBS | SYSTOLIC BLOOD PRESSURE: 105 MMHG

## 2024-10-02 DIAGNOSIS — M77.11 LATERAL EPICONDYLITIS OF RIGHT ELBOW: Primary | ICD-10-CM

## 2024-10-02 PROCEDURE — 99213 OFFICE O/P EST LOW 20 MIN: CPT | Performed by: SURGERY

## 2024-10-02 RX ORDER — MELOXICAM 15 MG/1
15 TABLET ORAL DAILY
Qty: 30 TABLET | Refills: 0 | Status: SHIPPED | OUTPATIENT
Start: 2024-10-02

## 2024-10-02 RX ORDER — METHYLPREDNISOLONE 4 MG
TABLET, DOSE PACK ORAL
Qty: 21 TABLET | Refills: 0 | Status: SHIPPED | OUTPATIENT
Start: 2024-10-02

## 2024-10-02 NOTE — PROGRESS NOTES
HPI:  44F here for follow-up.  She is known to us with a history of right cubital tunnel release 11/10/2023.  Today she complains of 2-month history of right forearm burning pain with wrist extension.  No injury, and no numbness/tingling in the fingers.        PE:  RUE:  No open wounds or erythema/swelling.  Medial elbow incision is nicely healed.  Tenderness to palpation lateral epicondyle.  Normal elbow ROM without pain.  + discomfort with wrist and finger extension.  SILT m/r/u nerve distribution.  Palpable radial pulse.  The UE is warm and well perfused.      A/P:  Right lateral epicondylitis  -Stretching exercise packet provided in the office today.  -Rx Medrol dose pack, then Rx Mobic provided for after medrol is complete.  -Activity modification  -Follow-up PRN, call us in the future if symptoms persist or return.

## 2024-10-21 ENCOUNTER — TELEPHONE (OUTPATIENT)
Age: 44
End: 2024-10-21

## 2024-10-21 NOTE — TELEPHONE ENCOUNTER
Pt will  to see if they can write letter. If not, she will call pcp office to make an appt. Same day slot ok per Dr Chatterjee

## 2024-10-21 NOTE — TELEPHONE ENCOUNTER
Patient called in stating she had seen specialist and they stated she had tendonitis. Patient stated she has missed work one day last week due to pain and specialist advised she reach out to pcp for a letter. Patient asked if she could get a letter stating she has pain/ tendonitis and may affect her work schedule. Patient stated she just needed a letter to provide to employer so she would not get in trouble. Patient asked if note could be sent to her on [facility]    Please  advise, thank you

## 2024-10-21 NOTE — TELEPHONE ENCOUNTER
I have not seen her for that issue. She needs to be seen to be evaluated if we have to write the letter or her ortho has to write one as he just saw her recently . Same days are okay to use    Dr mcleod

## 2024-10-31 DIAGNOSIS — M77.11 LATERAL EPICONDYLITIS OF RIGHT ELBOW: ICD-10-CM

## 2024-10-31 RX ORDER — MELOXICAM 15 MG/1
15 TABLET ORAL DAILY
Qty: 30 TABLET | Refills: 0 | Status: SHIPPED | OUTPATIENT
Start: 2024-10-31

## 2024-11-04 ENCOUNTER — TELEPHONE (OUTPATIENT)
Age: 44
End: 2024-11-04

## 2024-11-04 DIAGNOSIS — M77.11 LATERAL EPICONDYLITIS OF RIGHT ELBOW: Primary | ICD-10-CM

## 2024-11-04 NOTE — TELEPHONE ENCOUNTER
Caller: Patient     Doctor: Martin    Reason for call: Patient requesting order for PT for right elbow/wrist. She has been doing the home exercises and it did not help.   She also scheduled appt for tomorrow, 11/5    Call back#: [phone]

## 2024-11-05 ENCOUNTER — OFFICE VISIT (OUTPATIENT)
Dept: OBGYN CLINIC | Facility: CLINIC | Age: 44
End: 2024-11-05
Payer: COMMERCIAL

## 2024-11-05 VITALS
BODY MASS INDEX: 30.32 KG/M2 | SYSTOLIC BLOOD PRESSURE: 125 MMHG | WEIGHT: 182 LBS | HEIGHT: 65 IN | DIASTOLIC BLOOD PRESSURE: 70 MMHG

## 2024-11-05 DIAGNOSIS — M77.11 LATERAL EPICONDYLITIS OF RIGHT ELBOW: Primary | ICD-10-CM

## 2024-11-05 PROCEDURE — 99213 OFFICE O/P EST LOW 20 MIN: CPT | Performed by: SURGERY

## 2024-11-05 RX ORDER — IBUPROFEN 400 MG/1
TABLET, FILM COATED ORAL EVERY 6 HOURS PRN
COMMUNITY

## 2024-11-05 NOTE — PROGRESS NOTES
HPI:  44F here for follow-up.  She is known to us with a history of right cubital tunnel release 11/10/2023.  She states she has been doing the home exercise program and still has significant lateral elbow pain with radiation to the wrist.  Symptoms are much worse with wrist extension especially while she is doing tasks at work.    PE:  RUE:  No open wounds or erythema/swelling.  Medial elbow incision is nicely healed.  Tenderness to palpation lateral epicondyle.  Normal elbow ROM without pain.  + discomfort with wrist and finger extension.  SILT m/r/u nerve distribution.  Palpable radial pulse.  The UE is warm and well perfused.      A/P:  Right lateral epicondylitis  -Start formal physical therapy.  -NSAIDs PRN.  -Activity modification.  -Follow-up 2 months for re-evaluation.

## 2024-11-05 NOTE — LETTER
November 5, 2024     Patient: Flavia Laura  YOB: 1980  Date of Visit: 11/5/2024      To Whom it May Concern:    Flavia Laura is under my professional care. Flavia was seen in my office on 11/5/2024.     If you have any questions or concerns, please don't hesitate to call.         Sincerely,          Dean Salazar MD        CC: No Recipients

## 2024-11-20 ENCOUNTER — EVALUATION (OUTPATIENT)
Dept: PHYSICAL THERAPY | Facility: REHABILITATION | Age: 44
End: 2024-11-20
Payer: COMMERCIAL

## 2024-11-20 DIAGNOSIS — M79.631 RIGHT FOREARM PAIN: Primary | ICD-10-CM

## 2024-11-20 DIAGNOSIS — M77.11 LATERAL EPICONDYLITIS OF RIGHT ELBOW: ICD-10-CM

## 2024-11-20 DIAGNOSIS — M54.12 CERVICAL RADICULITIS: ICD-10-CM

## 2024-11-20 PROCEDURE — 97162 PT EVAL MOD COMPLEX 30 MIN: CPT

## 2024-11-20 PROCEDURE — 97110 THERAPEUTIC EXERCISES: CPT

## 2024-11-20 NOTE — PROGRESS NOTES
PT Evaluation     Today's date: 2024  Patient name: Flavia Laura  : 1980  MRN: [id number]  Referring provider: Naseem Lopez PA-C  Dx:   Encounter Diagnosis     ICD-10-CM    1. Right forearm pain  M79.631 PT plan of care cert/re-cert      2. Lateral epicondylitis of right elbow  M77.11 Ambulatory Referral to Physical Therapy     PT plan of care cert/re-cert      3. Cervical radiculitis  M54.12 PT plan of care cert/re-cert          Start Time: 1100  Stop Time: 1150  Total time in clinic (min): 50 minutes    Assessment  Impairments: abnormal muscle firing, abnormal or restricted ROM, abnormal movement, activity intolerance, impaired physical strength, lacks appropriate home exercise program, pain with function, poor posture , poor body mechanics, participation limitations and activity limitations  Symptom irritability: moderate    Assessment details: Problem List:  1) Cervical range of motion  2)  strength  3) Wrist extensor strength  4) Forearm supination strength    Flavia Laura is a pleasant 44 y.o. female who presents with chronic right forearm pain and neck pain with radiating pain. A portion of symptoms appear related to cervical spine, given reproduction of symptoms with cervical motions and positive neurodynamic testing. While she has strength deficits of palmar ab/adductor musculature on her involved side, deficits are likely related to patient's ulnar nerve history rather than radiculopathy. She has strength deficits and pain of the muscles of her posterior compartment of her forearm including wrist extensors and supinators.  Prescribed extension based interventions and discussed potential responses. Reassess next session. Deficits result in pain with gripping, reaching, and working.  No further referral appears necessary at this time based upon examination results.  I expect she will have fair improvement with skilled physical therapy.        Comparable signs:  1) right elbow pain  produced with cervical extension and retraction  2) Painful, weak forearm supination  Barriers to intervention comments: work schedules  Understanding of Dx/Px/POC: good     Prognosis: fair    Goals  Short Term Goals:   1. Patient will demonstrate independence with HEP by providing return demonstration of exercises  2. Patient will report decreased symptom intensity during activity by 50%  3. Patient will improve deficient MMTs by 1/2 grade   4. Patient will have negative ULTT testing    Long Term Goals- by discharge:   1. Patient will improve FOTO to greater then goal  2. Patient will improve pain with activity to 2/10 or less  3. Patient will continue with HEP to allow for continued progress and function  4. Patient will improve deficient MMTs by 1 full grade  5. Patient will have full, painless cervical ROM  6. Patient will report mild <3/10 symptoms onset after work        Plan  Patient would benefit from: skilled physical therapy  Referral necessary: No    Planned therapy interventions: joint mobilization, manual therapy, muscle pump exercises, neuromuscular re-education, patient education, strengthening, stretching, therapeutic activities, therapeutic exercise, home exercise program, graded exercise, graded activity, gait training, functional ROM exercises, flexibility, body mechanics training, balance, nerve gliding and postural training    Plan of Care beginning date: 11/20/2024  Plan of Care expiration date: 2/12/2025  Treatment plan discussed with: patient  Plan details: 1-2x per week per patient availability        Subjective Evaluation    History of Present Illness  Mechanism of injury: history of right cubital tunnel release 11/10/2023.  Symptoms started around same time of cubital tunnel release with gradual worsening in August 2024. She was evaluated by her ortho in October where she was prescribed medrol dosepack, mobic prescription after, and given stretches consisting of wrist extensor  strengthening and finger extensor strengthening. She reports compliance with her ortho's HEP and continued symptoms. She reports symptoms are distal to her elbow, and will radiate into her posterior forearm, dorsal hand and fingers. 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 Has attempted to adjust work due to symptoms with little benefit. Has been wearing a compressive sleeve with slight reduction in symptoms.               Recurrent probem    Quality of life: fair    Patient Goals  Patient goals for therapy: decreased pain, improved balance, increased motion, increased strength, independence with ADLs/IADLs, return to sport/leisure activities and return to work    Pain  Current pain rating: 3  At best pain ratin  At worst pain ratin  Quality: throbbing, burning and pulling (pulsing)  Progression: no change    Hand dominance: right    Treatments  Previous treatment: medication (Mobic, ibuprofen, oral steroids, HEP)        Objective    Dermatome:  C3: Intact to light touch bilaterally  C4:Intact to light touch bilaterally  C5: Intact to light touch bilaterally  C6: Intact to light touch bilaterally  C7: Intact to light touch bilaterally  C8: Intact to light touch bilaterally  T1: Intact to light touch bilaterally    Myotomes   C4: Grossly intact and equal bilaterally  C5: Grossly intact and equal bilaterally  C6: Grossly intact and equal bilaterally  C7: Grossly intact and equal bilaterally  C8: Grossly intact and equal bilaterally  T1: Difficulty maintaining finger ab/adduction on the right, WNL on the left           Reflexes:  (L/R)   Biceps C5-6:  2+ bilaterally  Brachioradialis C5-6:   2+ bilaterally  Triceps-C7:  2+ bilaterally       Carrillo:  negative       Cervical Restriction Response   Flex. Mod Right sided neck pain   Extn. Mod- limited lower cervical motion, hinge through upper cervical spine None   SB Left Mod None   SB Right Mod None   ROT Left mild None   ROT Right nil None        AROM        PROM       MMT           L       R        L           R      L     R   Elbow         Flexion WNL WNL   5 4+   Extension 5 hyperextension 5 hyperextension (p)    4+ 4(p)            Forearm         Supination >90 80 (p)   4+ 4 (p)   Pronation 90 70   4+  4+            Wrist         Flex  Mild limitation   5 4   Ext  Mild limitation    5 4 (p)                Neuro Dynamic Testing:  Median Nerve: L=  negative  R= positive  Ulnar Nerve: L=not tested    R=   not tested   Radial Nerve: L= negative   R=   negative        strength  Left:  Elbow flexed  Trial 1: 55 lbs  Trial 2: 54 lbs  Trial 3: 54 lbs    Right  Elbow flexed  Trial 1: 19lbs  Trial 2: 25lbs  Trial 3: 25lbs    Right  elbow extended  Trial 1: 24lbs  Trial 2: 26lbs    Comments:   no change in symptom intensity with elbow flexion or extension  no strength or symptoms with mulligan MWM    Palpation  Non tender and no restrictions through right posterior upper arm  Mildly tender to palpation at common extensor tendon  Diffuse tenderness to palpation through posterior forearm musculature, with most tenderness at supinator  Non tender through dorsal wrist and hand      Cervical repeated motions  Retractions: produce right upper arm stretch- not worse  Retractions self OP: produce right posterior forearm burning and stretch; not worse  Retraction extension: produce posterior right forearm stretch; increase right elbow pain       Precautions: Standard, Hx R. cubital tunnel release.      Manuals 11/20            STM Supinator poss nv                                                   Neuro Re-Ed                                                                                                        Ther Ex             Retractions self OP HEP            Repeated motions testing Supine poss nv            Supination             Wrist extension             Finger extension                                                                 Ther Activity                                        Gait Training                                       Modalities

## 2024-12-13 NOTE — TELEPHONE ENCOUNTER
Pt called in and left a message requesting a return call to the message she received. Please reach out to her when you are able.     # [phone] Unable to assess due to patient's cognitive impairment

## 2025-02-14 ENCOUNTER — TELEPHONE (OUTPATIENT)
Age: 45
End: 2025-02-14

## 2025-02-14 NOTE — TELEPHONE ENCOUNTER
Contacted patient off of Medication Management  and Talk Therapy  wait list to verify needs of services in attempts to update list with patient preferences. LVM for patient to contact intake dept  in regards to updating wait list.       Per PROMISe verification patient has ACTIVE [facility] Coverage  Recipient ID [id number]    [facility]

## 2025-03-06 NOTE — TELEPHONE ENCOUNTER
Contacted patient off of Medication Management  and Talk Therapy  wait list to verify needs of services in attempts to update list with patient preferences. LVM for patient to contact intake dept  in regards to updating wait list.         Per PROMISe verification patient has ACTIVE [facility] Coverage  Recipient ID [id number]    [facility]          PATIENT REMOVED FROM LIST DUE TO UNABLE TO CONTACT PATIENT AFTER 2ND ATTEMPT

## 2025-03-10 ENCOUNTER — HOSPITAL ENCOUNTER (OUTPATIENT)
Dept: MAMMOGRAPHY | Facility: HOSPITAL | Age: 45
Discharge: HOME | End: 2025-03-10
Payer: COMMERCIAL

## 2025-03-10 VITALS — HEIGHT: 65 IN | BODY MASS INDEX: 27.82 KG/M2 | WEIGHT: 167 LBS

## 2025-03-10 DIAGNOSIS — Z12.31 ENCOUNTER FOR SCREENING MAMMOGRAM FOR BREAST CANCER: ICD-10-CM

## 2025-03-10 PROCEDURE — 77063 BREAST TOMOSYNTHESIS BI: CPT

## 2025-03-10 PROCEDURE — 77067 SCR MAMMO BI INCL CAD: CPT

## 2025-03-11 ENCOUNTER — TELEPHONE (OUTPATIENT)
Age: 45
End: 2025-03-11

## 2025-03-11 NOTE — TELEPHONE ENCOUNTER
Per note, 2/6/25, patient was called for WL maintenance, removed from WL.   Patient called back and would like to be on wait list for MM and TT. Information is the same, [place] location preferred, Female provider preferred.   Patient has been added to the Medication Management and Talk Therapy wait list with a referral.    Insurance: [facility]  Insurance Type:    Commercial []   Medicaid [x]   [facility] (if applicable)   Medicare []  Location Preference: [place]  Provider Preference: Female  Virtual: Yes [x] No []  Were outside resources sent: Yes [] No [x]

## 2025-03-13 ENCOUNTER — TELEPHONE (OUTPATIENT)
Dept: ADMINISTRATIVE | Facility: OTHER | Age: 45
End: 2025-03-13

## 2025-03-13 ENCOUNTER — OFFICE VISIT (OUTPATIENT)
Dept: FAMILY MEDICINE CLINIC | Facility: CLINIC | Age: 45
End: 2025-03-13
Payer: COMMERCIAL

## 2025-03-13 VITALS
HEIGHT: 65 IN | BODY MASS INDEX: 28.32 KG/M2 | OXYGEN SATURATION: 98 % | RESPIRATION RATE: 17 BRPM | HEART RATE: 67 BPM | DIASTOLIC BLOOD PRESSURE: 70 MMHG | WEIGHT: 170 LBS | TEMPERATURE: 98.3 F | SYSTOLIC BLOOD PRESSURE: 100 MMHG

## 2025-03-13 DIAGNOSIS — R63.4 WEIGHT LOSS: Primary | ICD-10-CM

## 2025-03-13 DIAGNOSIS — G43.009 MIGRAINE WITHOUT AURA AND WITHOUT STATUS MIGRAINOSUS, NOT INTRACTABLE: ICD-10-CM

## 2025-03-13 DIAGNOSIS — F41.9 ANXIETY: ICD-10-CM

## 2025-03-13 DIAGNOSIS — E66.3 OVERWEIGHT (BMI 25.0-29.9): ICD-10-CM

## 2025-03-13 PROCEDURE — 99214 OFFICE O/P EST MOD 30 MIN: CPT | Performed by: FAMILY MEDICINE

## 2025-03-13 NOTE — TELEPHONE ENCOUNTER
----- Message from Parvin PETIT sent at 3/13/2025 10:29 AM EDT -----  Regarding: Care Gap Request  03/13/25 10:29 AM    Hello, our patient above has had Mammogram completed/performed. Please assist in updating the patient chart by pulling the document from the Imaging/Procedure Tab. The date of service is 03/10/2025.     Thank you,  NAVEEN Abarca PG

## 2025-03-13 NOTE — PROGRESS NOTES
"Name: Flavia Laura      : 1980      MRN: [id number]  Encounter Provider: Velma Chatterjee MD  Encounter Date: 3/13/2025   [facility]  :  Assessment & Plan  Weight loss    Orders:  •  Comprehensive metabolic panel  •  CBC and differential  •  Lipid panel  •  Hemoglobin A1C  •  TSH, 3rd generation with Free T4 reflex    Anxiety  Doing well on wellbutrin        Overweight (BMI 25.0-29.9)  Lost weight due to change in diet   Will r/o other causes of weight loss   To continue diet and exercise        Migraine without aura and without status migrainosus, not intractable  Doing well   No issues recently              History of Present Illness   HPI  Here for concerns with weight loss   She lost 50 pounds in 1 year with some healthy eating  Not hungry as much and not eating late at night   Denies fever/night sweats  No blood in stool/urine     Review of Systems   Constitutional: Negative.    HENT: Negative.     Respiratory: Negative.     Cardiovascular: Negative.    Genitourinary: Negative.    Neurological: Negative.    Hematological: Negative.    Psychiatric/Behavioral: Negative.         Objective   /70 (BP Location: Left arm, Patient Position: Sitting, Cuff Size: Standard)   Pulse 67   Temp 98.3 °F (36.8 °C) (Tympanic)   Resp 17   Ht 5' 5\" (1.651 m)   Wt 77.1 kg (170 lb)   LMP 2025 (Approximate)   SpO2 98%   BMI 28.29 kg/m²      Physical Exam  Constitutional:       Appearance: Normal appearance.   Cardiovascular:      Rate and Rhythm: Normal rate and regular rhythm.      Pulses: Normal pulses.      Heart sounds: Normal heart sounds.   Pulmonary:      Effort: Pulmonary effort is normal.      Breath sounds: Normal breath sounds.   Neurological:      General: No focal deficit present.      Mental Status: She is alert and oriented to person, place, and time.   Psychiatric:         Mood and Affect: Mood normal.         Behavior: Behavior normal. "       Administrative Statements   I have spent a total time of 25 minutes in caring for this patient on the day of the visit/encounter including Risks and benefits of tx options, Instructions for management, Patient and family education, Importance of tx compliance, and Risk factor reductions.

## 2025-03-13 NOTE — TELEPHONE ENCOUNTER
Upon review of the In Basket request we were able to identify that the patient had the requested item(s) completed internally. Internal labs/procedures/tests are not able to be linked to . HM will show as updated but a hyperlink to the document is not possible at this time. The item(s) requested can be found within the Chart Review tabs.     Any additional questions or concerns should be emailed to the Practice Liaisons via the appropriate education email address, please do not reply via In Basket.    Thank you  Matilda Woods MA   [facility]

## 2025-03-13 NOTE — ASSESSMENT & PLAN NOTE
Lost weight due to change in diet   Will r/o other causes of weight loss   To continue diet and exercise

## 2025-03-25 ENCOUNTER — TELEPHONE (OUTPATIENT)
Dept: OBGYN CLINIC | Facility: CLINIC | Age: 45
End: 2025-03-25

## 2025-03-25 NOTE — TELEPHONE ENCOUNTER
Sharifm asking patient what we are seeing her for on 3/27/25 at Dr. Ponce office. Wondering if it is a new issue or second opinion

## 2025-03-28 ENCOUNTER — TELEPHONE (OUTPATIENT)
Dept: OBGYN CLINIC | Facility: CLINIC | Age: 45
End: 2025-03-28

## 2025-03-28 NOTE — TELEPHONE ENCOUNTER
Message was sent today 3/28/25 to patient to inform of no show for her appointment with Dr Young on 3/27/2025 at 8:30 am.    No show has been recorded in the patients chart.

## 2025-05-01 ENCOUNTER — RESULTS FOLLOW-UP (OUTPATIENT)
Dept: FAMILY MEDICINE CLINIC | Facility: CLINIC | Age: 45
End: 2025-05-01

## 2025-05-01 ENCOUNTER — APPOINTMENT (OUTPATIENT)
Dept: LAB | Facility: CLINIC | Age: 45
End: 2025-05-01
Attending: NURSE PRACTITIONER
Payer: COMMERCIAL

## 2025-05-01 ENCOUNTER — OFFICE VISIT (OUTPATIENT)
Dept: FAMILY MEDICINE CLINIC | Facility: CLINIC | Age: 45
End: 2025-05-01
Payer: COMMERCIAL

## 2025-05-01 VITALS
RESPIRATION RATE: 17 BRPM | BODY MASS INDEX: 27.82 KG/M2 | HEART RATE: 81 BPM | WEIGHT: 167 LBS | OXYGEN SATURATION: 98 % | TEMPERATURE: 98.1 F | HEIGHT: 65 IN

## 2025-05-01 DIAGNOSIS — E78.5 DYSLIPIDEMIA: ICD-10-CM

## 2025-05-01 DIAGNOSIS — G56.21 ULNAR NEUROPATHY OF RIGHT UPPER EXTREMITY: Primary | ICD-10-CM

## 2025-05-01 LAB
ALBUMIN SERPL BCG-MCNC: 4.2 G/DL (ref 3.5–5)
ALP SERPL-CCNC: 66 U/L (ref 34–104)
ALT SERPL W P-5'-P-CCNC: 10 U/L (ref 7–52)
ANION GAP SERPL CALCULATED.3IONS-SCNC: 9 MMOL/L (ref 4–13)
AST SERPL W P-5'-P-CCNC: 14 U/L (ref 13–39)
BASOPHILS # BLD AUTO: 0.06 THOUSANDS/ÂΜL (ref 0–0.1)
BASOPHILS NFR BLD AUTO: 1 % (ref 0–1)
BILIRUB SERPL-MCNC: 0.62 MG/DL (ref 0.2–1)
BUN SERPL-MCNC: 11 MG/DL (ref 5–25)
CALCIUM SERPL-MCNC: 9.4 MG/DL (ref 8.4–10.2)
CHLORIDE SERPL-SCNC: 104 MMOL/L (ref 96–108)
CHOLEST SERPL-MCNC: 197 MG/DL (ref ?–200)
CO2 SERPL-SCNC: 26 MMOL/L (ref 21–32)
CREAT SERPL-MCNC: 0.84 MG/DL (ref 0.6–1.3)
EOSINOPHIL # BLD AUTO: 0.31 THOUSAND/ÂΜL (ref 0–0.61)
EOSINOPHIL NFR BLD AUTO: 5 % (ref 0–6)
ERYTHROCYTE [DISTWIDTH] IN BLOOD BY AUTOMATED COUNT: 12.3 % (ref 11.6–15.1)
EST. AVERAGE GLUCOSE BLD GHB EST-MCNC: 97 MG/DL
GFR SERPL CREATININE-BSD FRML MDRD: 86 ML/MIN/1.73SQ M
GLUCOSE P FAST SERPL-MCNC: 83 MG/DL (ref 65–99)
HBA1C MFR BLD: 5 %
HCT VFR BLD AUTO: 40.7 % (ref 34.8–46.1)
HDLC SERPL-MCNC: 43 MG/DL
HGB BLD-MCNC: 12.9 G/DL (ref 11.5–15.4)
IMM GRANULOCYTES # BLD AUTO: 0.01 THOUSAND/UL (ref 0–0.2)
IMM GRANULOCYTES NFR BLD AUTO: 0 % (ref 0–2)
LDLC SERPL CALC-MCNC: 136 MG/DL (ref 0–100)
LYMPHOCYTES # BLD AUTO: 1.89 THOUSANDS/ÂΜL (ref 0.6–4.47)
LYMPHOCYTES NFR BLD AUTO: 30 % (ref 14–44)
MCH RBC QN AUTO: 31.2 PG (ref 26.8–34.3)
MCHC RBC AUTO-ENTMCNC: 31.7 G/DL (ref 31.4–37.4)
MCV RBC AUTO: 98 FL (ref 82–98)
MONOCYTES # BLD AUTO: 0.37 THOUSAND/ÂΜL (ref 0.17–1.22)
MONOCYTES NFR BLD AUTO: 6 % (ref 4–12)
NEUTROPHILS # BLD AUTO: 3.73 THOUSANDS/ÂΜL (ref 1.85–7.62)
NEUTS SEG NFR BLD AUTO: 58 % (ref 43–75)
NONHDLC SERPL-MCNC: 154 MG/DL
NRBC BLD AUTO-RTO: 0 /100 WBCS
PLATELET # BLD AUTO: 269 THOUSANDS/UL (ref 149–390)
PMV BLD AUTO: 11.7 FL (ref 8.9–12.7)
POTASSIUM SERPL-SCNC: 4.9 MMOL/L (ref 3.5–5.3)
PROT SERPL-MCNC: 6.6 G/DL (ref 6.4–8.4)
RBC # BLD AUTO: 4.14 MILLION/UL (ref 3.81–5.12)
SODIUM SERPL-SCNC: 139 MMOL/L (ref 135–147)
TRIGL SERPL-MCNC: 88 MG/DL (ref ?–150)
TSH SERPL DL<=0.05 MIU/L-ACNC: 2.08 UIU/ML (ref 0.45–4.5)
WBC # BLD AUTO: 6.37 THOUSAND/UL (ref 4.31–10.16)

## 2025-05-01 PROCEDURE — 99214 OFFICE O/P EST MOD 30 MIN: CPT | Performed by: NURSE PRACTITIONER

## 2025-05-01 NOTE — PROGRESS NOTES
"Name: Flavia Laura      : 1980      MRN: [id number]  Encounter Provider: MAURICE Macdonald  Encounter Date: 2025   Encounter department: [facility]    Assessment & Plan  Ulnar neuropathy of right upper extremity    Orders:    Ambulatory Referral to Orthopedic Surgery; Future    BMI Counseling: Body mass index is 27.79 kg/m². The BMI is above normal. Exercise recommendations include exercising 3-5 times per week and strength training exercises.     Depression Screening and Follow-up Plan: Patient was screened for depression during today's encounter. They screened negative with a PHQ-9 score of 2.          History of Present Illness     Here for continued c/o right elbow numbness and tingling after cubital tunnel release  Feels electrical shock sensations  Feels pulling sensation as well  Radiates down to wrist and hand  Cannot dorsiflex wrist up and then hand starts to shake  Decrease strength in right hand as well  Pain of right forearm can be as high as a \"7\" on scale of 1 to 10 at times  Had been given steroids orally, PT with no symptom relief  Had been following with Dr Handley at [facility]  Would like another opinion   Affecting her job now  Works in food industry, cuts and preps food  She is not upset dr handley, just needs quicker and more aggressive treatment due to not wanting to lose her job and she would like to get symptom relief and not be disabled        Review of Systems   Constitutional:  Negative for fatigue and fever.   HENT:  Negative for congestion and postnasal drip.    Respiratory:  Negative for cough, shortness of breath and wheezing.    Cardiovascular:  Negative for chest pain and palpitations.   Gastrointestinal:  Negative for constipation, diarrhea, nausea and vomiting.   Musculoskeletal:  Positive for arthralgias and myalgias.   Skin:  Negative for rash.   Neurological:  Negative for dizziness, light-headedness and headaches.   Hematological:  Negative for " adenopathy.   Psychiatric/Behavioral:  Negative for dysphoric mood and sleep disturbance. The patient is not nervous/anxious.      Past Medical History:   Diagnosis Date    Abnormal Pap smear of cervix     Anxiety     Cervical cancer (HCC)     Depression     HPV (human papilloma virus) infection     Moderate dysplasia of cervix ([place] II)     Last assessed 2016     Past Surgical History:   Procedure Laterality Date    CERVICAL BIOPSY  W/ LOOP ELECTRODE EXCISION      FOOT SURGERY Right     FL NEUROPLASTY &/TRANSPOSITION ULNAR NERVE ELBOW Right 11/10/2023    Procedure: RELEASE CUBITAL TUNNEL;  Surgeon: Dean Salazar MD;  Location: [facility];  Service: Orthopedics    TONSILLECTOMY AND ADENOIDECTOMY      TUBAL LIGATION       Family History   Problem Relation Age of Onset    Alcohol abuse Mother     Diabetes Father     No Known Problems Sister     No Known Problems Daughter     No Known Problems Daughter     Diabetes Maternal Grandmother     Ovarian cancer Maternal Grandmother     Cancer Maternal Grandmother     Diabetes Paternal Grandmother     No Known Problems Brother     No Known Problems Son     Diabetes Maternal Aunt     No Known Problems Maternal Aunt     No Known Problems Maternal Aunt     No Known Problems Maternal Aunt     No Known Problems Maternal Aunt     No Known Problems Maternal Aunt     No Known Problems Maternal Aunt     Breast cancer Paternal Aunt     Breast cancer additional onset Paternal Aunt     Cancer Paternal Aunt      Social History     Tobacco Use    Smoking status: Former     Current packs/day: 0.00     Types: Cigarettes     Quit date: 2002     Years since quittin.3     Passive exposure: Never    Smokeless tobacco: Never    Tobacco comments:     1 pk weekly   Vaping Use    Vaping status: Never Used   Substance and Sexual Activity    Alcohol use: No    Drug use: No    Sexual activity: Yes     Partners: Male     Birth control/protection: Female Sterilization     Current Outpatient  "Medications on File Prior to Visit   Medication Sig    buPROPion (WELLBUTRIN XL) 300 mg 24 hr tablet Take 1 tablet (300 mg total) by mouth every morning    fluticasone (FLONASE) 50 mcg/act nasal spray 2 sprays into each nostril daily (Patient not taking: Reported on 5/1/2025)     No Known Allergies  Immunization History   Administered Date(s) Administered    COVID-19 MODERNA VACC 0.5 ML IM 04/15/2021, 05/13/2021    INFLUENZA 12/12/2017    Influenza Quadrivalent Preservative Free 3 years and older IM 10/23/2014, 12/12/2017    Tdap 08/04/2020     Objective   Pulse 81   Temp 98.1 °F (36.7 °C) (Tympanic)   Resp 17   Ht 5' 5\" (1.651 m)   Wt 75.8 kg (167 lb)   SpO2 98%   BMI 27.79 kg/m²     Physical Exam  Vitals and nursing note reviewed.   Constitutional:       Appearance: Normal appearance.   HENT:      Head: Normocephalic and atraumatic.      Right Ear: Tympanic membrane, ear canal and external ear normal.      Left Ear: Tympanic membrane, ear canal and external ear normal.      Nose: Nose normal.      Mouth/Throat:      Mouth: Mucous membranes are moist.   Eyes:      Conjunctiva/sclera: Conjunctivae normal.   Cardiovascular:      Rate and Rhythm: Normal rate and regular rhythm.      Pulses: Normal pulses.      Heart sounds: Normal heart sounds.   Pulmonary:      Effort: Pulmonary effort is normal.      Breath sounds: Normal breath sounds.   Abdominal:      General: Bowel sounds are normal.   Musculoskeletal:      Right wrist: Decreased range of motion.      Cervical back: Normal range of motion and neck supple.   Skin:     General: Skin is warm and dry.      Capillary Refill: Capillary refill takes less than 2 seconds.   Neurological:      General: No focal deficit present.      Mental Status: She is alert and oriented to person, place, and time.   Psychiatric:         Mood and Affect: Mood normal.         Behavior: Behavior normal.         Thought Content: Thought content normal.         Judgment: Judgment " normal.     BMI Counseling: Body mass index is 27.79 kg/m². The BMI is above normal. Nutrition recommendations include reducing portion sizes, decreasing overall calorie intake, 3-5 servings of fruits/vegetables daily, reducing fast food intake, consuming healthier snacks, decreasing soda and/or juice intake, moderation in carbohydrate intake, increasing intake of lean protein, reducing intake of saturated fat and trans fat, and reducing intake of cholesterol. Exercise recommendations include moderate aerobic physical activity for 150 minutes/week, exercising 3-5 times per week, and strength training exercises.

## 2025-05-05 ENCOUNTER — HOSPITAL ENCOUNTER (OUTPATIENT)
Dept: ULTRASOUND IMAGING | Facility: CLINIC | Age: 45
Discharge: HOME | End: 2025-05-05
Payer: COMMERCIAL

## 2025-05-05 ENCOUNTER — HOSPITAL ENCOUNTER (OUTPATIENT)
Dept: MAMMOGRAPHY | Facility: CLINIC | Age: 45
Discharge: HOME | End: 2025-05-05
Payer: COMMERCIAL

## 2025-05-05 DIAGNOSIS — R92.8 ABNORMAL SCREENING MAMMOGRAM: ICD-10-CM

## 2025-05-05 PROCEDURE — 76642 ULTRASOUND BREAST LIMITED: CPT

## 2025-05-05 PROCEDURE — G0279 TOMOSYNTHESIS, MAMMO: HCPCS

## 2025-05-05 PROCEDURE — 77065 DX MAMMO INCL CAD UNI: CPT

## 2025-05-06 ENCOUNTER — TELEPHONE (OUTPATIENT)
Dept: OBGYN CLINIC | Facility: CLINIC | Age: 45
End: 2025-05-06

## 2025-05-07 ENCOUNTER — OFFICE VISIT (OUTPATIENT)
Dept: OBGYN CLINIC | Facility: CLINIC | Age: 45
End: 2025-05-07
Attending: NURSE PRACTITIONER
Payer: COMMERCIAL

## 2025-05-07 DIAGNOSIS — G56.21 ULNAR NEUROPATHY OF RIGHT UPPER EXTREMITY: ICD-10-CM

## 2025-05-07 DIAGNOSIS — G56.01 CARPAL TUNNEL SYNDROME ON RIGHT: Primary | ICD-10-CM

## 2025-05-07 DIAGNOSIS — M77.8 TENDINITIS OF RIGHT FOREARM: ICD-10-CM

## 2025-05-07 DIAGNOSIS — G56.31 RADIAL TUNNEL SYNDROME, RIGHT: ICD-10-CM

## 2025-05-07 PROCEDURE — 99214 OFFICE O/P EST MOD 30 MIN: CPT | Performed by: SURGERY

## 2025-05-07 NOTE — PROGRESS NOTES
Frank Young M.D.  Attending, Orthopaedic Surgery  Hand, Wrist, and Elbow Surgery  [facility]      ORTHOPAEDIC HAND, WRIST, AND ELBOW OFFICE  VISIT       ASSESSMENT/PLAN:        Assessment & Plan  Carpal tunnel syndrome on right  US ordered for evaluation   Brace provided for night time use   Follow up after US     Orders:    Ambulatory Referral to PT/OT Hand Therapy; Future    US Carpal Tunnel; Future    Cock Up Wrist Splint    Radial tunnel syndrome, right  US guided injection ordered today in conjunction with therapy and bracing at night   Advised this will take time and work to improve     Orders:    US msk guidance; Future    Ambulatory Referral to PT/OT Hand Therapy; Future    Ulnar neuropathy of right upper extremity  Symptoms improved following surgery   No residual pain at the surgical site  She does have some residual paresthesias in the small and ring finger but this is significantly improved following surgery     Orders:    Ambulatory Referral to Orthopedic Surgery    Tendinitis of right forearm  Hand therapy ordered   Unfortunately there is no quick fix for this but with consistent therapy and night time bracing this should improve     Orders:    Ambulatory Referral to PT/OT Hand Therapy; Future    Cock Up Wrist Splint             The patient verbalized understanding of exam findings and treatment plan. We engaged in the shared decision-making process and treatment options were discussed at length with the patient. Surgical and conservative management discussed today along with risks and benefits.      Follow Up:  Return in about 6 weeks (around 6/18/2025) for After Testing.    To Do Next Visit:  Re-evaluation of current issue      General Discussions:  Carpal Tunnel Syndrome: The anatomy and physiology of carpal tunnel syndrome was discussed with the patient today.  Increase pressure localized under the transverse carpal ligament can cause pain, numbness, tingling, or  dysesthesias within the median nerve distribution as well as feelings of fatigue, clumsiness, or awkwardness.  These symptoms typically occur at night and worse in the morning upon waking.  Eventually, untreated carpal tunnel syndrome can result in weakness and permanent loss of muscle within the thenar compartment of the hand.  Treatment options were discussed with the patient.  Conservative treatment includes nocturnal resting splints to keep the nerve in a neutral position, ergonomic changes within the work or home environment, activity modification, and tendon gliding exercises. Vitamin B6 one tablet daily over the counter may helpful to reduce symptoms.   Steroid injections within the carpal canal can help a majority of patients, however this is often self-limited in a majority of patients.  Surgical intervention to divide the transverse carpal ligament typically results in a long-lasting relief of the patient's complaints, with the recurrence rate of less than 1%.                                                                                                                                                                                   ____________________________________________________________________________________________________________________________________________      CHIEF COMPLAINT:  Chief Complaint   Patient presents with    Right Arm - Pain, Numbness     Patient has pain and numbness from the elbow to the hand.        SUBJECTIVE:  Flavia Laura is a 44 y.o. year old RHD female who presents for evaluation of the right upper extremity. She has hx of right cubital tunnel syndrome and release in 2023. Surgery overall went well and her ulnar nerve symptoms did improve. She returned to the office for elbow and forearm pain and was sent to therapy for lateral epicondylitis which did not help. Today she feels her symptoms are worsening. She notes pain in the forearm volarly and dorsally as well as  paresthesias in the 2nd-5th digits. She works in  which irritates her symptoms.      I have personally reviewed all the relevant PMH, PSH, SH, FH, Medications and allergies      PAST MEDICAL HISTORY:  Past Medical History:   Diagnosis Date    Abnormal Pap smear of cervix     Anxiety     Cervical cancer (HCC)     Depression     HPV (human papilloma virus) infection     Moderate dysplasia of cervix ([place] II)     Last assessed 2016       PAST SURGICAL HISTORY:  Past Surgical History:   Procedure Laterality Date    CERVICAL BIOPSY  W/ LOOP ELECTRODE EXCISION      FOOT SURGERY Right     [place] NEUROPLASTY &/TRANSPOSITION ULNAR NERVE ELBOW Right 11/10/2023    Procedure: RELEASE CUBITAL TUNNEL;  Surgeon: Dean Salazar MD;  Location: [facility];  Service: Orthopedics    TONSILLECTOMY AND ADENOIDECTOMY      TUBAL LIGATION         FAMILY HISTORY:  Family History   Problem Relation Age of Onset    Alcohol abuse Mother     Diabetes Father     No Known Problems Sister     No Known Problems Daughter     No Known Problems Daughter     Diabetes Maternal Grandmother     Ovarian cancer Maternal Grandmother     Cancer Maternal Grandmother     Diabetes Paternal Grandmother     No Known Problems Brother     No Known Problems Son     Diabetes Maternal Aunt     No Known Problems Maternal Aunt     No Known Problems Maternal Aunt     No Known Problems Maternal Aunt     No Known Problems Maternal Aunt     No Known Problems Maternal Aunt     No Known Problems Maternal Aunt     Breast cancer Paternal Aunt     Breast cancer additional onset Paternal Aunt     Cancer Paternal Aunt        SOCIAL HISTORY:  Social History     Tobacco Use    Smoking status: Former     Current packs/day: 0.00     Types: Cigarettes     Quit date: 2002     Years since quittin.3     Passive exposure: Never    Smokeless tobacco: Never    Tobacco comments:     1 pk weekly   Vaping Use    Vaping status: Never Used   Substance Use Topics    Alcohol use:  No    Drug use: No       MEDICATIONS:    Current Outpatient Medications:     buPROPion (WELLBUTRIN XL) 300 mg 24 hr tablet, Take 1 tablet (300 mg total) by mouth every morning, Disp: 90 tablet, Rfl: 0    fluticasone (FLONASE) 50 mcg/act nasal spray, 2 sprays into each nostril daily, Disp: 16 g, Rfl: 0    ALLERGIES:  No Known Allergies        REVIEW OF SYSTEMS:  Review of Systems   Constitutional:  Negative for chills and fever.   HENT:  Negative for ear pain and sore throat.    Eyes:  Negative for pain and visual disturbance.   Respiratory:  Negative for cough and shortness of breath.    Cardiovascular:  Negative for chest pain and palpitations.   Gastrointestinal:  Negative for abdominal pain and vomiting.   Genitourinary:  Negative for dysuria and hematuria.   Musculoskeletal:  Positive for myalgias. Negative for arthralgias and back pain.   Skin:  Negative for color change and rash.   Neurological:  Positive for numbness. Negative for seizures and syncope.   All other systems reviewed and are negative.      VITALS:  There were no vitals filed for this visit.    LABS:      _____________________________________________________  PHYSICAL EXAMINATION:  General: well developed and well nourished, alert, oriented times 3, and appears comfortable  Psychiatric: Normal  HEENT: Normocephalic, Atraumatic Trachea Midline, No torticollis  Pulmonary: No audible wheezing or respiratory distress   Abdomen/GI: Non tender, non distended   Cardiovascular: No pitting edema, 2+ radial pulse   Skin: No masses, erythema, lacerations, fluctation, ulcerations  Neurovascular: Sensation Intact to the Median, Ulnar, Radial Nerve, Motor Intact to the Median, Ulnar, Radial Nerve, and Pulses Intact  Musculoskeletal: Normal, except as noted in detailed exam and in HPI.      MUSCULOSKELETAL EXAMINATION:  Right Carpal Tunnel Exam:    Negative thenar atrophy. Positive phalen's test. Negative carpal tunnel compression. Negative tinels over median  nerve at the wrist.  Opposition strength 5/5.  Abduction strength 5/5.      No tenderness at lateral epicondyle  + tenderness at the radial tunnel     ___________________________________________________  STUDIES REVIEWED:  No new imaging to review       LABS REVIEWED:    HgA1c:   Lab Results   Component Value Date    HGBA1C 5.0 05/01/2025     BMP:   Lab Results   Component Value Date    CALCIUM 9.4 05/01/2025    K 4.9 05/01/2025    CO2 26 05/01/2025     05/01/2025    BUN 11 05/01/2025    CREATININE 0.84 05/01/2025               PROCEDURES PERFORMED:  Procedures  No Procedures performed today    _____________________________________________________      Scribe Attestation      I,:  Amber Thompson PA-C am acting as a scribe while in the presence of the attending physician.:       I,:  Frank Young MD personally performed the services described in this documentation    as scribed in my presence.:

## 2025-05-07 NOTE — ASSESSMENT & PLAN NOTE
Symptoms improved following surgery   No residual pain at the surgical site  She does have some residual paresthesias in the small and ring finger but this is significantly improved following surgery     Orders:    Ambulatory Referral to Orthopedic Surgery

## 2025-05-07 NOTE — ASSESSMENT & PLAN NOTE
Hand therapy ordered   Unfortunately there is no quick fix for this but with consistent therapy and night time bracing this should improve     Orders:    Ambulatory Referral to PT/OT Hand Therapy; Future    Cock Up Wrist Splint

## 2025-05-07 NOTE — ASSESSMENT & PLAN NOTE
US ordered for evaluation   Brace provided for night time use   Follow up after US     Orders:    Ambulatory Referral to PT/OT Hand Therapy; Future    US Carpal Tunnel; Future    Cock Up Wrist Splint

## 2025-05-12 ENCOUNTER — HOSPITAL ENCOUNTER (OUTPATIENT)
Dept: RADIOLOGY | Facility: HOSPITAL | Age: 45
Discharge: HOME | End: 2025-05-12
Attending: PHYSICIAN ASSISTANT
Payer: COMMERCIAL

## 2025-05-12 DIAGNOSIS — G56.01 CARPAL TUNNEL SYNDROME ON RIGHT: ICD-10-CM

## 2025-05-12 PROCEDURE — 76882 US LMTD JT/FCL EVL NVASC XTR: CPT

## 2025-05-14 ENCOUNTER — EVALUATION (OUTPATIENT)
Dept: OCCUPATIONAL THERAPY | Age: 45
End: 2025-05-14
Attending: PHYSICIAN ASSISTANT
Payer: COMMERCIAL

## 2025-05-14 DIAGNOSIS — G56.31 RADIAL TUNNEL SYNDROME, RIGHT: ICD-10-CM

## 2025-05-14 DIAGNOSIS — G56.01 CARPAL TUNNEL SYNDROME ON RIGHT: Primary | ICD-10-CM

## 2025-05-14 DIAGNOSIS — M65.231: ICD-10-CM

## 2025-05-14 PROCEDURE — 97110 THERAPEUTIC EXERCISES: CPT

## 2025-05-14 PROCEDURE — 97166 OT EVAL MOD COMPLEX 45 MIN: CPT

## 2025-05-14 NOTE — PROGRESS NOTES
OT Evaluation     Today's date: 2025  Patient name: Flavia Laura  : 1980  MRN: [id number]  Referring provider: Amber Thompson PA-C  Dx:   Encounter Diagnosis     ICD-10-CM    1. Carpal tunnel syndrome on right  G56.01       2. Radial tunnel syndrome, right  G56.31       3. Calcific tendinitis of right forearm  M65.231           Start Time: 1115  Stop Time: 1200  Total time in clinic (min): 45 minutes    Assessment  Impairments: impaired physical strength, lacks appropriate home exercise program and pain with function    Assessment details: Pt presents for OT eval with R CTS, RTS, and tendinitis of R forearm. Subjectively, pt reports discomfort at rest with R UE numbness/tingling and weakness. She has mild to moderate pain with most daily activities. Having trouble gripping/pinching/holding eating utensils, cutting, etc. Having difficulty completing work duties due to symptoms. Objectively, she reports increased symptoms with provocative tests for carpal and radial tunnel syndrome. She has some neural tension in the R UE. She has decreased AROM and strength on the R vs L. She demonstrated incoordination with wrist and digit extension when focusing on those movements, however awkward movement patterns and shaking decreased when patient was distracted. Issued R UE AROM, wrist stretches, and radial/median nerve gliding exercises. OT recommending services 1x/week for 6 weeks. Pt understands/agrees with current POC.   Understanding of Dx/Px/POC: good     Prognosis: good    Goals  Short term goals:  To be achieved within 2-3 visits from start of care on 25     Patient will demonstrate independence with recommended AROM and stretching HEPs.   Patient will demonstrate independence with postural/positional awareness and /ergonomic correction recommendations.  Patient will be educated on and demonstrate understanding of recommended nerve gliding/flossing techniques.   Patient will be instructed on  the benefits and effective use of appropriate modalities for edema control, soft-tissue extensibility, and pain management.   Patient will be instructed on the benefits and effective use of appropriate adaptive equipment and/or bracing devices.   Patient will be report improved functional participation with implementation of recommended pacing, activity modifications, and symptom management strategies.     Long term goals:  To be achieved at time of discharge:   Patient will report decreased pain throughout normal roles/routines to <2/10.   Patient will report improved ability to complete ADLs/IADLs compared to IE.   Patient will demonstrate improved  and pinch strength to within 10% of the uninvolved side for increased readiness to return to work.   Patient will report readiness for d/c from OT.         Plan  Patient would benefit from: custom splinting and skilled occupational therapy  Referral necessary: Yes  Planned modality interventions: thermotherapy: hydrocollator packs    Planned therapy interventions: IADL retraining, activity modification, ADL retraining, behavior modification, body mechanics training, flexibility, functional ROM exercises, graded activity, graded exercise, home exercise program, therapeutic exercise, therapeutic activities, stretching, strengthening, orthotic fitting/training, orthotic management and training, patient education, manual therapy and joint mobilization    Frequency: 1x week  Duration in weeks: 6  Plan of Care beginning date: 5/14/2025  Plan of Care expiration date: 7/9/2025  Treatment plan discussed with: patient        Subjective Evaluation    History of Present Illness  Mechanism of injury: Flavia is a 45 yo RHD female who presents to OT for eval with R forearm pain, tendinitis, dx of Radial and Carpal tunnel syndromes. Pt with hx of previous CuTR on the R in November of 2023. She had a US performed yesterday and CTS was confirmed.    Occupational Profile  ADLs:  painful, difficult using the R side but doing it as needed.    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 Having difficulty with holding knifes, holding utensils, grabbing food items, etc.       Quality of life: good    Patient Goals  Patient goals for therapy: return to work, independence with ADLs/IADLs, increased strength, return to sport/leisure activities, increased motion, decreased edema and decreased pain    Pain  Current pain ratin  At best pain rating: 3  At worst pain ratin  Quality: sharp, throbbing, tight and discomfort  Progression: worsening    Treatments  Current treatment: occupational therapy        Objective      Tissue Integrity: n/a      Sensation (Ten-Test )  Right Hand (thumb to small finger): 4/10, 10/10, 7/10, 10/10, 10/10  Left Hand (thumb to small finger): 10/10, 10/10, 10/10, 10/10, 10/10    Special Tests: + increase in N/T with Durkan's over radial and carpal tunnels. Pain with palpation over radial tunnel. No pain with resisted long finger extension, pain with resisted wrist extension    R hand MMT  4/5 APB strength   3+/5 intrinsic strength    AROM       Wrist   Right Left   Flexion 45 75   Extension 58 80   Radial Dev. 35 20   Ulnar Dev. 36 32       /Pinch Strength  Dynamometer R UE  L UE comments   Position #2 (lbs) 27.4 64.8     Pinch Meter          Lateral 5 14      3 JAW SHORTY 6.5 10.5      2-point 5  8              Precautions: R CTS    Manuals   eval                 IASTM                                                                                        Ther Ex                       Forearm AROM                  Wrist AROM/PROM Wrist stretches edu             Tendon glides x20             MNGs, RNGs X15 each                           Graded strengthening                                                                                                                     Ther Activity                   Grasp/release                  Reach to target                Dexterity                Proprioception                                                                   Neuro Re-Ed                                                               Ortho Fit                                                               Modalities                  HP

## 2025-05-21 ENCOUNTER — OFFICE VISIT (OUTPATIENT)
Dept: OCCUPATIONAL THERAPY | Age: 45
End: 2025-05-21
Attending: PHYSICIAN ASSISTANT
Payer: COMMERCIAL

## 2025-05-21 DIAGNOSIS — G56.01 CARPAL TUNNEL SYNDROME ON RIGHT: Primary | ICD-10-CM

## 2025-05-21 DIAGNOSIS — M65.231: ICD-10-CM

## 2025-05-21 DIAGNOSIS — G56.31 RADIAL TUNNEL SYNDROME, RIGHT: ICD-10-CM

## 2025-05-21 PROCEDURE — 97110 THERAPEUTIC EXERCISES: CPT

## 2025-05-21 PROCEDURE — 97140 MANUAL THERAPY 1/> REGIONS: CPT

## 2025-05-21 NOTE — PROGRESS NOTES
"Daily Note     Today's date: 2025  Patient name: Flavia Laura  : 1980  MRN: [id number]  Referring provider: Amber Thompson PA-C  Dx:   Encounter Diagnosis     ICD-10-CM    1. Carpal tunnel syndrome on right  G56.01       2. Radial tunnel syndrome, right  G56.31       3. Calcific tendinitis of right forearm  M65.231           Start Time: 1147  Stop Time: 1232  Total time in clinic (min): 45 minutes    Subjective: \"I've been doing the nerve glides and ROM exercises.\"      Objective: See treatment diary below      Assessment: Tolerated treatment well. Pt continues with same symptoms. She reports compliance with HEPs so far. Requires cues to rest between sets and to perform nerve gliding exercises. Patient would benefit from continued OT      Plan: Continue per plan of care.  Progress treatment as tolerated.       Precautions: R CTS    Manuals   eval                 IASTM    8'                                                                                    Ther Ex                       Forearm AROM    x20              Wrist AROM/PROM Wrist stretches edu X20  3x30s            Tendon glides x20 x20            MNGs, RNGs X15 each X15 each                          Graded strengthening  2lb wrist curls    Towel squeeze 10x10s                                                                                                                  Ther Activity                   Grasp/release                  Reach to target                Dexterity                Proprioception                                                                   Neuro Re-Ed                                                               Ortho Fit                                                               Modalities                  HP    5'                                               "

## 2025-05-28 ENCOUNTER — APPOINTMENT (OUTPATIENT)
Dept: OCCUPATIONAL THERAPY | Age: 45
End: 2025-05-28
Attending: PHYSICIAN ASSISTANT
Payer: COMMERCIAL

## 2025-06-02 NOTE — NURSING NOTE
Unable to reach patient, sent instructions and directions in e-mail that is linked to this account and via epic my chart. See message below.  Upcoming Radiology appointment at [facility]  Good afternoon Ms. Laura,    Alejandro have an upcoming appointment here at [facility] on 6/10/25  @ 8:45 am  for a steroid injection to your right arm utilizing Ultrasound guidance . Please arrive 15 minutes prior to your appointment time.    [facility] radiology is located at [address] [place] [address], [facility]. Present yourself to admission services that is located on the ground floor to the left of the information desk in Building B to register for your test.. Please sign in using the Kiosk (if any issues with your registration, an admission personnel will assist you). Once registered you can go up to the 1st floor - radiology department (it will be to the right at the main corridor on the 1st floor).     For this test you may eat, drink, take all your usual medications, including aspirin should you take this medication. In regard to driving - if you are not taking any medication for anti-anxiety for the procedure you may drive yourself. BUT  if you are taking medications for anxiety (Xanax, Ativan etc.)  for the procedure you will need a .           If you have any questions or concerns regarding the above information,  please feel free to send me a response via a call, email or [employer] chart.      If we have not spoken yet and understand the above information and have no further questions or concerns can you please send me a response via a call, email or [employer] chart that you have received and understood the information.     May you have a good day,   Odalys Renee RN  [facility] Radiology RN  [address] [place] [address], [place] [address]  [phone] (Office)  [phone] (Fax)  [email or web address]    Patient returned call stating she  understands instructions.

## 2025-06-10 ENCOUNTER — HOSPITAL ENCOUNTER (OUTPATIENT)
Dept: RADIOLOGY | Facility: HOSPITAL | Age: 45
Discharge: HOME | End: 2025-06-10
Attending: PHYSICIAN ASSISTANT
Payer: COMMERCIAL

## 2025-06-10 DIAGNOSIS — G56.31 RADIAL TUNNEL SYNDROME, RIGHT: ICD-10-CM

## 2025-06-10 PROCEDURE — 20605 DRAIN/INJ JOINT/BURSA W/O US: CPT

## 2025-06-10 RX ORDER — DEXAMETHASONE SODIUM PHOSPHATE 10 MG/ML
4 INJECTION, SOLUTION INTRAMUSCULAR; INTRAVENOUS ONCE
Status: COMPLETED | OUTPATIENT
Start: 2025-06-10 | End: 2025-06-10

## 2025-06-10 RX ORDER — LIDOCAINE HYDROCHLORIDE 10 MG/ML
1 INJECTION, SOLUTION EPIDURAL; INFILTRATION; INTRACAUDAL; PERINEURAL ONCE
Status: COMPLETED | OUTPATIENT
Start: 2025-06-10 | End: 2025-06-10

## 2025-06-10 RX ADMIN — DEXAMETHASONE SODIUM PHOSPHATE 0.5 MG: 10 INJECTION, SOLUTION INTRAMUSCULAR; INTRAVENOUS at 09:20

## 2025-06-10 RX ADMIN — LIDOCAINE HYDROCHLORIDE 1 ML: 10 INJECTION, SOLUTION EPIDURAL; INFILTRATION; INTRACAUDAL; PERINEURAL at 09:12

## 2025-06-18 ENCOUNTER — OFFICE VISIT (OUTPATIENT)
Dept: OBGYN CLINIC | Facility: CLINIC | Age: 45
End: 2025-06-18
Payer: COMMERCIAL

## 2025-06-18 VITALS — WEIGHT: 165 LBS | HEIGHT: 65 IN | BODY MASS INDEX: 27.49 KG/M2

## 2025-06-18 DIAGNOSIS — M77.8 TENDINITIS OF RIGHT FOREARM: ICD-10-CM

## 2025-06-18 DIAGNOSIS — G56.31 RADIAL TUNNEL SYNDROME, RIGHT: ICD-10-CM

## 2025-06-18 DIAGNOSIS — G56.01 CARPAL TUNNEL SYNDROME ON RIGHT: Primary | ICD-10-CM

## 2025-06-18 PROCEDURE — 20526 THER INJECTION CARP TUNNEL: CPT | Performed by: SURGERY

## 2025-06-18 PROCEDURE — 99213 OFFICE O/P EST LOW 20 MIN: CPT | Performed by: SURGERY

## 2025-06-18 RX ORDER — DEXAMETHASONE SODIUM PHOSPHATE 10 MG/ML
40 INJECTION, SOLUTION INTRAMUSCULAR; INTRAVENOUS
Status: COMPLETED | OUTPATIENT
Start: 2025-06-18 | End: 2025-06-18

## 2025-06-18 RX ORDER — LIDOCAINE HYDROCHLORIDE 10 MG/ML
1 INJECTION, SOLUTION INFILTRATION; PERINEURAL
Status: COMPLETED | OUTPATIENT
Start: 2025-06-18 | End: 2025-06-18

## 2025-06-18 RX ADMIN — LIDOCAINE HYDROCHLORIDE 1 ML: 10 INJECTION, SOLUTION INFILTRATION; PERINEURAL at 07:30

## 2025-06-18 RX ADMIN — DEXAMETHASONE SODIUM PHOSPHATE 40 MG: 10 INJECTION, SOLUTION INTRAMUSCULAR; INTRAVENOUS at 07:30

## 2025-06-18 NOTE — PROGRESS NOTES
ASSESSMENT/PLAN:      Assessment & Plan  Carpal tunnel syndrome on right  US results were reviewed. Right carpal tunnel syndrome ruled in.   The etiology of carpal tunnel syndrome was discussed as well as treatment options.  We discussed the possibility of a right carpal tunnel CSI which can be diagnostic as well as therapeutic vs a right carpal tunnel release as nighttime bracing has not been beneficial for her.   Right carpal tunnel CSI was performed in the office without complication. Post injection protocol/expectations were reviewed.  Dicussed the US will help with sensation but will not improve strength.   Follow up PRN.   Orders:    Hand/upper extremity injection: R carpal tunnel    Ambulatory Referral to PT/OT Hand Therapy; Future    Radial tunnel syndrome, right  Tendinitis of right forearm  It was discussed with Flavia that therapy is the main stay of treatment regarding tendinitis, updated OT script was provided.  Follow up PRN.      Orders:    Ambulatory Referral to PT/OT Hand Therapy; Future      The patient verbalized understanding of exam findings and treatment plan. We engaged in the shared decision-making process and treatment options were discussed at length with the patient. Surgical and conservative management discussed today along with risks and benefits.    Diagnoses and all orders for this visit:    Carpal tunnel syndrome on right  -     Hand/upper extremity injection: R carpal tunnel  -     Ambulatory Referral to PT/OT Hand Therapy; Future    Radial tunnel syndrome, right  -     Ambulatory Referral to PT/OT Hand Therapy; Future    Tendinitis of right forearm  -     Ambulatory Referral to PT/OT Hand Therapy; Future      Follow Up:  Return if symptoms worsen or fail to improve.      To Do Next Visit:  Re-evaluation of current issue    ____________________________________________________________________________________________________________________________________________      CHIEF  COMPLAINT:  Chief Complaint   Patient presents with    Follow-up     MSK review       SUBJECTIVE:  Flavia Laura is a 44 y.o. year old RHD female who presents to the office for a follow up regarding her right upper extremity. She is here today to review right carpal tunnel US results. She underwent a US guided right radial tunnel CSI. She feels the CSI may have helped with the pressure and tightness she was experiencing in her forearm but not the pain. She notes pain to the medial aspect of her elbow that radiates to her fingers as well as anterior aspect of the elbow/forearm. She note numbness and tingling to her index and long fingers. She braced at night for a few weeks but feels this made numbness worse. She had a few therapy sessions thus far and does perform exercises at home. She will take Ibuprofen as needed for pain control.         I have personally reviewed all the relevant PMH, PSH, SH, FH, Medications and allergies.     PAST MEDICAL HISTORY:  Past Medical History[1]    PAST SURGICAL HISTORY:  Past Surgical History[2]    FAMILY HISTORY:  Family History[3]    SOCIAL HISTORY:  Social History[4]    MEDICATIONS:  Current Medications[5]    ALLERGIES:  Allergies[6]    REVIEW OF SYSTEMS:  Review of Systems   Constitutional:  Negative for chills, fever and unexpected weight change.   HENT:  Negative for hearing loss, nosebleeds and sore throat.    Eyes:  Negative for pain, redness and visual disturbance.   Respiratory:  Negative for cough, shortness of breath and wheezing.    Cardiovascular:  Negative for chest pain, palpitations and leg swelling.   Gastrointestinal:  Negative for abdominal pain, nausea and vomiting.   Endocrine: Negative for polydipsia and polyuria.   Genitourinary:  Negative for difficulty urinating and hematuria.   Musculoskeletal:  Positive for myalgias. Negative for arthralgias and joint swelling.   Skin:  Negative for rash and wound.   Neurological:  Positive for numbness. Negative for  dizziness and headaches.   Psychiatric/Behavioral:  Negative for decreased concentration, dysphoric mood and suicidal ideas. The patient is not nervous/anxious.        VITALS:  There were no vitals filed for this visit.      _____________________________________________________  PHYSICAL EXAMINATION:  General: well developed and well nourished, alert, oriented times 3, and appears comfortable  Psychiatric: Normal  HEENT: Normocephalic, Atraumatic Trachea Midline, No torticollis  Pulmonary: No audible wheezing or respiratory distress   Cardiovascular: No pitting edema, 2+ radial pulse   Abdominal/GI: abdomen non tender, non distended   Skin: No masses, erythema, lacerations, fluctation, ulcerations  Neurovascular: Sensation Intact to the Median, Ulnar, Radial Nerve, Motor Intact to the Median, Ulnar, Radial Nerve, and Pulses Intact  Musculoskeletal: Normal, except as noted in detailed exam and in HPI.      MUSCULOSKELETAL EXAMINATION:    Right Carpal Tunnel Exam:  Negative thenar atrophy. Positive phalen's test. Negative carpal tunnel compression. Negative tinels over median nerve at the wrist.  Opposition strength 5/5.  Abduction strength 5/5.       No tenderness at lateral epicondyle  + tenderness at the radial tunnel   ___________________________________________________    STUDIES REVIEWED:  I have personally reviewed and interpreted  US of right carpal tunnel which demonstrate right carpal tunnel syndrome ruled in.      LABS REVIEWED:    HgA1c:   Lab Results   Component Value Date    HGBA1C 5.0 05/01/2025     BMP:   Lab Results   Component Value Date    CALCIUM 9.4 05/01/2025    K 4.9 05/01/2025    CO2 26 05/01/2025     05/01/2025    BUN 11 05/01/2025    CREATININE 0.84 05/01/2025             PROCEDURES PERFORMED:  Hand/upper extremity injection: R carpal tunnel    [place] Protocol:  procedure performed by consultantConsent: Verbal consent obtained. Written consent not obtained  Risks and benefits: risks,  benefits and alternatives were discussed  Consent given by: patient  Patient understanding: patient states understanding of the procedure being performed  Site marked: the operative site was marked  Patient identity confirmed: verbally with patient  Supporting Documentation  Indications: pain   Procedure Details  Condition:carpal tunnel syndrome Site: R carpal tunnel   Needle size: 25 G  Ultrasound guidance: no  Medications administered: 1 mL lidocaine 1 %; 40 mg dexamethasone 100 mg/10 mL  Patient tolerance: patient tolerated the procedure well with no immediate complications  Dressing:  Sterile dressing applied         -  _____________________________________________________      Scribe Attestation      I,:  Rebecca Akins MA am acting as a scribe while in the presence of the attending physician.:       I,:  Frank Young MD personally performed the services described in this documentation    as scribed in my presence.:                      [1]   Past Medical History:  Diagnosis Date    Abnormal Pap smear of cervix     Anxiety     Cervical cancer (HCC)     Depression     HPV (human papilloma virus) infection     Moderate dysplasia of cervix ([place] II)     Last assessed 8/18/2016   [2]   Past Surgical History:  Procedure Laterality Date    CERVICAL BIOPSY  W/ LOOP ELECTRODE EXCISION      FOOT SURGERY Right     MO NEUROPLASTY &/TRANSPOSITION ULNAR NERVE ELBOW Right 11/10/2023    Procedure: RELEASE CUBITAL TUNNEL;  Surgeon: Dean Salazar MD;  Location: [place] [facility] OR;  Service: Orthopedics    TONSILLECTOMY AND ADENOIDECTOMY      TUBAL LIGATION     [3]   Family History  Problem Relation Name Age of Onset    Alcohol abuse Mother Laurita     Diabetes Father Mando     No Known Problems Sister      No Known Problems Daughter Poli     No Known Problems Daughter Kimberly     Diabetes Maternal Grandmother Grandma     Ovarian cancer Maternal Grandmother Grandma     Cancer Maternal Grandmother Grandma     Diabetes Paternal  Grandmother Michelle Zarate     No Known Problems Brother      No Known Problems Son Oswaldo     Diabetes Maternal Aunt Odalys     No Known Problems Maternal Aunt      No Known Problems Maternal Aunt      No Known Problems Maternal Aunt      No Known Problems Maternal Aunt      No Known Problems Maternal Aunt      No Known Problems Maternal Aunt      Breast cancer Paternal Aunt      Breast cancer additional onset Paternal Aunt      Cancer Paternal Aunt Julia    [4]   Social History  Tobacco Use    Smoking status: Former     Current packs/day: 0.00     Types: Cigarettes     Quit date: 2002     Years since quittin.4     Passive exposure: Never    Smokeless tobacco: Never    Tobacco comments:     1 pk weekly   Vaping Use    Vaping status: Never Used   Substance Use Topics    Alcohol use: No    Drug use: No   [5]   Current Outpatient Medications:     buPROPion (WELLBUTRIN XL) 300 mg 24 hr tablet, Take 1 tablet (300 mg total) by mouth every morning, Disp: 90 tablet, Rfl: 0    fluticasone (FLONASE) 50 mcg/act nasal spray, 2 sprays into each nostril daily, Disp: 16 g, Rfl: 0  [6] No Known Allergies

## 2025-06-18 NOTE — ASSESSMENT & PLAN NOTE
US results were reviewed. Right carpal tunnel syndrome ruled in.   The etiology of carpal tunnel syndrome was discussed as well as treatment options.  We discussed the possibility of a right carpal tunnel CSI which can be diagnostic as well as therapeutic vs a right carpal tunnel release as nighttime bracing has not been beneficial for her.   Right carpal tunnel CSI was performed in the office without complication. Post injection protocol/expectations were reviewed.  Dicussed the US will help with sensation but will not improve strength.   Follow up PRN.   Orders:    Hand/upper extremity injection: R carpal tunnel    Ambulatory Referral to PT/OT Hand Therapy; Future

## 2025-06-18 NOTE — ASSESSMENT & PLAN NOTE
It was discussed with Flavia that therapy is the main stay of treatment regarding tendinitis, updated OT script was provided.  Follow up PRN.      Orders:    Ambulatory Referral to PT/OT Hand Therapy; Future

## 2025-06-25 ENCOUNTER — TELEPHONE (OUTPATIENT)
Dept: FAMILY MEDICINE CLINIC | Facility: CLINIC | Age: 45
End: 2025-06-25

## (undated) DEVICE — 3M™ STERI-STRIP™ REINFORCED ADHESIVE SKIN CLOSURES, R1547, 1/2 IN X 4 IN (12 MM X 100 MM), 6 STRIPS/ENVELOPE: Brand: 3M™ STERI-STRIP™

## (undated) DEVICE — INTENDED FOR TISSUE SEPARATION, AND OTHER PROCEDURES THAT REQUIRE A SHARP SURGICAL BLADE TO PUNCTURE OR CUT.: Brand: BARD-PARKER ® CARBON RIB-BACK BLADES

## (undated) DEVICE — STERILE BETHLEHEM PLASTIC HAND: Brand: CARDINAL HEALTH

## (undated) DEVICE — GAUZE SPONGES,16 PLY: Brand: CURITY

## (undated) DEVICE — SUT VICRYL 3-0 PS-2 27 IN J427H

## (undated) DEVICE — ACE WRAP 3 IN STERILE

## (undated) DEVICE — CAST PADDING 4 IN SYNTHETIC NON-STRL

## (undated) DEVICE — ADHESIVE SKIN CLOSR DERMABOND PRINEO

## (undated) DEVICE — SCD SEQUENTIAL COMPRESSION COMFORT SLEEVE MEDIUM KNEE LENGTH: Brand: KENDALL SCD

## (undated) DEVICE — PADDING CAST 4 IN  COTTON STRL

## (undated) DEVICE — GLOVE INDICATOR PI UNDERGLOVE SZ 7.5 BLUE

## (undated) DEVICE — GLOVE SRG BIOGEL 7.5

## (undated) DEVICE — GLOVE INDICATOR PI UNDERGLOVE SZ 8 BLUE

## (undated) DEVICE — CUFF TOURNIQUET 18 X 4 IN QUICK CONNECT DISP 1 BLADDER

## (undated) DEVICE — ACE WRAP 3 IN UNSTERILE